# Patient Record
Sex: FEMALE | Race: BLACK OR AFRICAN AMERICAN | Employment: FULL TIME | URBAN - METROPOLITAN AREA
[De-identification: names, ages, dates, MRNs, and addresses within clinical notes are randomized per-mention and may not be internally consistent; named-entity substitution may affect disease eponyms.]

---

## 2017-07-11 ENCOUNTER — OFFICE VISIT (OUTPATIENT)
Dept: OBGYN CLINIC | Age: 27
End: 2017-07-11
Payer: MEDICAID

## 2017-07-11 VITALS
TEMPERATURE: 97.5 F | SYSTOLIC BLOOD PRESSURE: 117 MMHG | WEIGHT: 155.13 LBS | HEIGHT: 65 IN | HEART RATE: 83 BPM | OXYGEN SATURATION: 100 % | DIASTOLIC BLOOD PRESSURE: 65 MMHG | BODY MASS INDEX: 25.85 KG/M2

## 2017-07-11 DIAGNOSIS — O09.891 HISTORY OF PRETERM DELIVERY, CURRENTLY PREGNANT, FIRST TRIMESTER: ICD-10-CM

## 2017-07-11 DIAGNOSIS — O09.291 HX OF PREECLAMPSIA, PRIOR PREGNANCY, CURRENTLY PREGNANT, FIRST TRIMESTER: ICD-10-CM

## 2017-07-11 DIAGNOSIS — N91.2 AMENORRHEA: ICD-10-CM

## 2017-07-11 DIAGNOSIS — Z23 NEED FOR PROPHYLACTIC VACCINATION AGAINST DIPHTHERIA-TETANUS-PERTUSSIS (DTP): Primary | ICD-10-CM

## 2017-07-11 PROBLEM — O09.899 HISTORY OF PRETERM DELIVERY, CURRENTLY PREGNANT: Status: ACTIVE | Noted: 2017-07-11

## 2017-07-11 PROBLEM — O09.299 HX OF PREECLAMPSIA, PRIOR PREGNANCY, CURRENTLY PREGNANT: Status: ACTIVE | Noted: 2017-07-11

## 2017-07-11 PROCEDURE — 99213 OFFICE O/P EST LOW 20 MIN: CPT | Performed by: ADVANCED PRACTICE MIDWIFE

## 2017-07-11 RX ORDER — PNV NO.95/FERROUS FUM/FOLIC AC 28MG-0.8MG
1 TABLET ORAL DAILY
Qty: 30 TABLET | Refills: 12 | Status: SHIPPED | OUTPATIENT
Start: 2017-07-11

## 2017-07-17 ENCOUNTER — ROUTINE PRENATAL (OUTPATIENT)
Dept: PERINATAL CARE | Age: 27
End: 2017-07-17
Payer: MEDICAID

## 2017-07-17 VITALS
RESPIRATION RATE: 20 BRPM | HEART RATE: 100 BPM | SYSTOLIC BLOOD PRESSURE: 107 MMHG | WEIGHT: 159 LBS | BODY MASS INDEX: 26.46 KG/M2 | DIASTOLIC BLOOD PRESSURE: 67 MMHG | TEMPERATURE: 98.5 F

## 2017-07-17 DIAGNOSIS — Z3A.01 6 WEEKS GESTATION OF PREGNANCY: ICD-10-CM

## 2017-07-17 DIAGNOSIS — O36.80X0 EXAMINATION TO DETERMINE FETAL VIABILITY OF PREGNANCY, NOT APPLICABLE OR UNSPECIFIED FETUS: Primary | ICD-10-CM

## 2017-07-17 DIAGNOSIS — O09.291 HX OF PREECLAMPSIA, PRIOR PREGNANCY, CURRENTLY PREGNANT, FIRST TRIMESTER: ICD-10-CM

## 2017-07-17 PROCEDURE — 76817 TRANSVAGINAL US OBSTETRIC: CPT | Performed by: OBSTETRICS & GYNECOLOGY

## 2017-07-26 ENCOUNTER — HOSPITAL ENCOUNTER (OUTPATIENT)
Age: 27
Setting detail: SPECIMEN
Discharge: HOME | End: 2017-07-26
Payer: MEDICAID

## 2017-07-26 ENCOUNTER — ROUTINE PRENATAL (OUTPATIENT)
Dept: OBGYN CLINIC | Age: 27
End: 2017-07-26
Payer: MEDICAID

## 2017-07-26 VITALS
SYSTOLIC BLOOD PRESSURE: 120 MMHG | HEART RATE: 104 BPM | BODY MASS INDEX: 25.96 KG/M2 | DIASTOLIC BLOOD PRESSURE: 72 MMHG | WEIGHT: 156 LBS

## 2017-07-26 DIAGNOSIS — Z34.91 PRENATAL CARE, FIRST TRIMESTER: ICD-10-CM

## 2017-07-26 DIAGNOSIS — Z3A.08 8 WEEKS GESTATION OF PREGNANCY: ICD-10-CM

## 2017-07-26 DIAGNOSIS — Z34.91 PRENATAL CARE, FIRST TRIMESTER: Primary | ICD-10-CM

## 2017-07-26 LAB
AMPHETAMINE SCREEN URINE: NEGATIVE
BARBITURATE SCREEN URINE: NEGATIVE
BENZODIAZEPINE SCREEN, URINE: NEGATIVE
BILIRUBIN URINE: NEGATIVE
BUPRENORPHINE URINE: NORMAL
CANNABINOID SCREEN URINE: NEGATIVE
COCAINE METABOLITE, URINE: NEGATIVE
COLOR: YELLOW
COMMENT UA: NORMAL
GLUCOSE URINE: NEGATIVE
KETONES, URINE: NEGATIVE
LEUKOCYTE ESTERASE, URINE: NEGATIVE
MDMA URINE: NORMAL
METHADONE SCREEN, URINE: NEGATIVE
METHAMPHETAMINE, URINE: NORMAL
NITRITE, URINE: NEGATIVE
OPIATES, URINE: NEGATIVE
OXYCODONE SCREEN URINE: NEGATIVE
PH UA: 6.5 (ref 5–8)
PHENCYCLIDINE, URINE: NEGATIVE
PROPOXYPHENE, URINE: NORMAL
PROTEIN UA: NEGATIVE
SPECIFIC GRAVITY UA: 1.02 (ref 1–1.03)
TEST INFORMATION: NORMAL
TRICYCLIC ANTIDEPRESSANTS, UR: NORMAL
TURBIDITY: CLEAR
URINE HGB: NEGATIVE
UROBILINOGEN, URINE: NORMAL

## 2017-07-26 PROCEDURE — 99214 OFFICE O/P EST MOD 30 MIN: CPT | Performed by: ADVANCED PRACTICE MIDWIFE

## 2017-07-27 LAB
C. TRACHOMATIS DNA ,URINE: NEGATIVE
CULTURE: NORMAL
CULTURE: NORMAL
Lab: NORMAL
N. GONORRHOEAE DNA, URINE: NEGATIVE
SPECIMEN DESCRIPTION: NORMAL
STATUS: NORMAL

## 2017-08-22 ENCOUNTER — ROUTINE PRENATAL (OUTPATIENT)
Dept: OBGYN CLINIC | Age: 27
End: 2017-08-22
Payer: MEDICAID

## 2017-08-22 VITALS
DIASTOLIC BLOOD PRESSURE: 70 MMHG | SYSTOLIC BLOOD PRESSURE: 107 MMHG | WEIGHT: 158 LBS | BODY MASS INDEX: 26.29 KG/M2 | HEART RATE: 120 BPM

## 2017-08-22 DIAGNOSIS — Z3A.12 12 WEEKS GESTATION OF PREGNANCY: ICD-10-CM

## 2017-08-22 DIAGNOSIS — Z34.91 PRENATAL CARE, FIRST TRIMESTER: Primary | ICD-10-CM

## 2017-08-22 PROCEDURE — 99213 OFFICE O/P EST LOW 20 MIN: CPT | Performed by: ADVANCED PRACTICE MIDWIFE

## 2017-08-28 ENCOUNTER — ROUTINE PRENATAL (OUTPATIENT)
Dept: PERINATAL CARE | Age: 27
End: 2017-08-28
Payer: MEDICAID

## 2017-08-28 VITALS
SYSTOLIC BLOOD PRESSURE: 106 MMHG | RESPIRATION RATE: 20 BRPM | WEIGHT: 161 LBS | DIASTOLIC BLOOD PRESSURE: 60 MMHG | BODY MASS INDEX: 26.79 KG/M2 | HEART RATE: 88 BPM | TEMPERATURE: 98.4 F

## 2017-08-28 DIAGNOSIS — Z36.9 FIRST TRIMESTER SCREENING: Primary | ICD-10-CM

## 2017-08-28 DIAGNOSIS — Z3A.12 12 WEEKS GESTATION OF PREGNANCY: ICD-10-CM

## 2017-08-28 DIAGNOSIS — O09.891 SHORT INTERVAL BETWEEN PREGNANCIES COMPLICATING PREGNANCY, ANTEPARTUM, FIRST TRIMESTER: ICD-10-CM

## 2017-08-28 DIAGNOSIS — O09.291 HX OF PREECLAMPSIA, PRIOR PREGNANCY, CURRENTLY PREGNANT, FIRST TRIMESTER: ICD-10-CM

## 2017-08-28 PROBLEM — O09.899 SHORT INTERVAL BETWEEN PREGNANCIES COMPLICATING PREGNANCY, ANTEPARTUM: Status: ACTIVE | Noted: 2017-08-28

## 2017-08-28 PROCEDURE — 76813 OB US NUCHAL MEAS 1 GEST: CPT | Performed by: OBSTETRICS & GYNECOLOGY

## 2017-08-28 PROCEDURE — 76801 OB US < 14 WKS SINGLE FETUS: CPT | Performed by: OBSTETRICS & GYNECOLOGY

## 2017-09-14 ENCOUNTER — ROUTINE PRENATAL (OUTPATIENT)
Dept: OBGYN CLINIC | Age: 27
End: 2017-09-14
Payer: MEDICAID

## 2017-09-14 VITALS
WEIGHT: 162 LBS | HEART RATE: 86 BPM | BODY MASS INDEX: 26.96 KG/M2 | DIASTOLIC BLOOD PRESSURE: 60 MMHG | SYSTOLIC BLOOD PRESSURE: 110 MMHG

## 2017-09-14 DIAGNOSIS — O09.292 HX OF PREECLAMPSIA, PRIOR PREGNANCY, CURRENTLY PREGNANT, SECOND TRIMESTER: Primary | ICD-10-CM

## 2017-09-14 DIAGNOSIS — H53.8 BLURRED VISION, RIGHT EYE: ICD-10-CM

## 2017-09-14 DIAGNOSIS — R51.9 HEADACHE, UNSPECIFIED HEADACHE TYPE: ICD-10-CM

## 2017-09-14 PROCEDURE — 99213 OFFICE O/P EST LOW 20 MIN: CPT | Performed by: NURSE PRACTITIONER

## 2017-09-20 ENCOUNTER — HOSPITAL ENCOUNTER (OUTPATIENT)
Age: 27
Setting detail: SPECIMEN
Discharge: HOME | End: 2017-09-20
Payer: MEDICAID

## 2017-09-20 ENCOUNTER — ROUTINE PRENATAL (OUTPATIENT)
Dept: OBGYN CLINIC | Age: 27
End: 2017-09-20
Payer: MEDICAID

## 2017-09-20 VITALS
SYSTOLIC BLOOD PRESSURE: 111 MMHG | BODY MASS INDEX: 27.47 KG/M2 | HEART RATE: 110 BPM | DIASTOLIC BLOOD PRESSURE: 69 MMHG | WEIGHT: 165.1 LBS

## 2017-09-20 DIAGNOSIS — Z34.92 PRENATAL CARE, SECOND TRIMESTER: ICD-10-CM

## 2017-09-20 DIAGNOSIS — Z3A.16 16 WEEKS GESTATION OF PREGNANCY: Primary | ICD-10-CM

## 2017-09-20 DIAGNOSIS — Z3A.16 16 WEEKS GESTATION OF PREGNANCY: ICD-10-CM

## 2017-09-20 LAB
ABO/RH: NORMAL
ABSOLUTE EOS #: 0.1 K/UL (ref 0–0.4)
ABSOLUTE LYMPH #: 2.2 K/UL (ref 1–4.8)
ABSOLUTE MONO #: 0.4 K/UL (ref 0.1–1.2)
ANTIBODY IDENTIFICATION: NORMAL
ANTIBODY SCREEN: POSITIVE
BASOPHILS # BLD: 1 %
BASOPHILS ABSOLUTE: 0 K/UL (ref 0–0.2)
DIFFERENTIAL TYPE: ABNORMAL
EOSINOPHILS RELATIVE PERCENT: 2 %
HCT VFR BLD CALC: 33.3 % (ref 36–46)
HEMOGLOBIN: 10.9 G/DL (ref 12–16)
HEPATITIS B SURFACE ANTIGEN: NONREACTIVE
HIV AG/AB: NONREACTIVE
LYMPHOCYTES # BLD: 34 %
MCH RBC QN AUTO: 28.6 PG (ref 26–34)
MCHC RBC AUTO-ENTMCNC: 32.5 G/DL (ref 31–37)
MCV RBC AUTO: 87.8 FL (ref 80–100)
MONOCYTES # BLD: 6 %
PDW BLD-RTO: 15 % (ref 12.5–15.4)
PLATELET # BLD: 220 K/UL (ref 140–450)
PLATELET ESTIMATE: ABNORMAL
PMV BLD AUTO: 8.6 FL (ref 6–12)
RBC # BLD: 3.79 M/UL (ref 4–5.2)
RBC # BLD: ABNORMAL 10*6/UL
RUBV IGG SER QL: 472.6 IU/ML
SEG NEUTROPHILS: 57 %
SEGMENTED NEUTROPHILS ABSOLUTE COUNT: 3.8 K/UL (ref 1.8–7.7)
T. PALLIDUM, IGG: NONREACTIVE
WBC # BLD: 6.5 K/UL (ref 3.5–11)
WBC # BLD: ABNORMAL 10*3/UL

## 2017-09-20 PROCEDURE — 99213 OFFICE O/P EST LOW 20 MIN: CPT | Performed by: ADVANCED PRACTICE MIDWIFE

## 2017-10-11 ENCOUNTER — HOSPITAL ENCOUNTER (EMERGENCY)
Age: 27
Discharge: HOME | End: 2017-10-12
Attending: EMERGENCY MEDICINE
Payer: MEDICAID

## 2017-10-11 VITALS
RESPIRATION RATE: 14 BRPM | HEART RATE: 96 BPM | DIASTOLIC BLOOD PRESSURE: 66 MMHG | TEMPERATURE: 97.2 F | SYSTOLIC BLOOD PRESSURE: 115 MMHG | OXYGEN SATURATION: 97 %

## 2017-10-11 DIAGNOSIS — B37.9 YEAST INFECTION: Primary | ICD-10-CM

## 2017-10-11 PROCEDURE — 87491 CHLMYD TRACH DNA AMP PROBE: CPT

## 2017-10-11 PROCEDURE — 87591 N.GONORRHOEAE DNA AMP PROB: CPT

## 2017-10-11 PROCEDURE — 99284 EMERGENCY DEPT VISIT MOD MDM: CPT

## 2017-10-11 PROCEDURE — 87660 TRICHOMONAS VAGIN DIR PROBE: CPT

## 2017-10-11 PROCEDURE — 87510 GARDNER VAG DNA DIR PROBE: CPT

## 2017-10-11 PROCEDURE — 87480 CANDIDA DNA DIR PROBE: CPT

## 2017-10-11 PROCEDURE — 81003 URINALYSIS AUTO W/O SCOPE: CPT

## 2017-10-11 ASSESSMENT — ENCOUNTER SYMPTOMS
EYE PAIN: 0
ABDOMINAL PAIN: 0
VOMITING: 0
COLOR CHANGE: 0
COUGH: 0
NAUSEA: 0

## 2017-10-11 ASSESSMENT — PAIN DESCRIPTION - LOCATION: LOCATION: ABDOMEN

## 2017-10-11 ASSESSMENT — PAIN SCALES - GENERAL: PAINLEVEL_OUTOF10: 3

## 2017-10-11 ASSESSMENT — PAIN DESCRIPTION - DESCRIPTORS: DESCRIPTORS: CRAMPING

## 2017-10-11 ASSESSMENT — PAIN DESCRIPTION - ORIENTATION: ORIENTATION: RIGHT;LOWER

## 2017-10-11 ASSESSMENT — PAIN DESCRIPTION - PAIN TYPE: TYPE: ACUTE PAIN

## 2017-10-12 ENCOUNTER — TELEPHONE (OUTPATIENT)
Dept: OBGYN CLINIC | Age: 27
End: 2017-10-12

## 2017-10-12 LAB
BILIRUBIN URINE: NEGATIVE
C TRACH DNA GENITAL QL NAA+PROBE: NEGATIVE
COLOR: YELLOW
COMMENT UA: NORMAL
DIRECT EXAM: ABNORMAL
GLUCOSE URINE: NEGATIVE
KETONES, URINE: NEGATIVE
LEUKOCYTE ESTERASE, URINE: NEGATIVE
Lab: ABNORMAL
N. GONORRHOEAE DNA: NEGATIVE
NITRITE, URINE: NEGATIVE
PH UA: 6 (ref 5–8)
PROTEIN UA: NEGATIVE
SPECIFIC GRAVITY UA: 1.02 (ref 1–1.03)
SPECIMEN DESCRIPTION: ABNORMAL
STATUS: ABNORMAL
TURBIDITY: CLEAR
URINE HGB: NEGATIVE
UROBILINOGEN, URINE: NORMAL

## 2017-10-12 RX ORDER — CLOTRIMAZOLE 1 %
CREAM WITH APPLICATOR VAGINAL
Qty: 1 TUBE | Refills: 0 | Status: SHIPPED | OUTPATIENT
Start: 2017-10-12 | End: 2017-10-19

## 2017-10-12 NOTE — CONSULTS
chills  HEENT: negative visual disturbances, negative headaches  Respiratory: negative dyspnea, negative cough  Cardiovascular: negative chest pain,  negative palpitations  Gastrointestinal: lower abdominal cramping, negative RUQ pain, negative N/V, negative diarrhea, negative constipation  Genitourinary: negative dysuria, negative vaginal discharge, positive vaginal spotting   Dermatological: negative rash  Hematologic: negative bruising  Immunologic/Lymphatic: negative recent illness, negative recent sick contact  Musculoskeletal: negative back pain, negative myalgias, negative arthralgias  Neurological:  negative dizziness, negative weakness  Behavior/Psych: negative depression, negative anxiety      OBSTETRICAL HISTORY:   Obstetric History       T1      L2     SAB0   TAB0   Ectopic0   Molar0   Multiple0   Live Births0       # Outcome Date GA Lbr Epifanio/2nd Weight Sex Delivery Anes PTL Lv   3 Current            2  10/10/15 29w0d  2 lb 6 oz (1.077 kg) F Vag-Spont      1 Term 09 38w0d  5 lb 1 oz (2.296 kg) F Vag-Spont             PAST MEDICAL HISTORY:   has a past medical history of Depression. PAST SURGICAL HISTORY:   has no past surgical history on file. ALLERGIES:  has No Known Allergies. MEDICATIONS:  Prior to Admission medications    Medication Sig Start Date End Date Taking? Authorizing Provider   aspirin 81 MG tablet Take 81 mg by mouth daily   Yes Historical Provider, MD   Prenatal Vit-Fe Fumarate-FA (PRENATAL VITAMINS) 28-0.8 MG TABS Take 1 tablet by mouth daily Please dispense any generic insurance will cover. 17  Yes Sherry Rojas CNM       FAMILY HISTORY:  family history includes Alcohol Abuse in her paternal uncle;  Allergy (Severe) in her maternal grandmother; Arthritis in her maternal grandmother and paternal grandfather; Asthma in her maternal grandmother; Cancer in her paternal aunt; Depression in her mother; Diabetes in her maternal grandfather, maternal Hematocrit, Platelets: 58.5 / 84.1 / 220  Rubella: immune  T. Pallidum, IgG: non-reactive   Hepatitis B Surface Antigen: non-reactive   HIV: non-reactive   Sickle Cell Screen: not available  Gonorrhea: negative  Chlamydia: negative  Urine culture: negative, date: 2017    1 hour Glucose Tolerance Test: not done  3 hour Glucose Tolerance Test: N/A    Group B Strep: not done  Cystic Fibrosis Screen: not available  First Trimester Screen: low risk, patient declined, not available  MSAFP/Multiple Markers: not available  Non-Invasive Prenatal Testing: not available  Anatomy US: not available    ASSESSMENT & PLAN:  Margarita Conley is a 23 y.o. female  at 19w2d    - GBS not done / Rh positive / R immune   - No indication for GBS prophylaxis    Vaginal spotting   - Patient complaining of minimal vaginal spotting with wiping earlier today, also complaining of vaginal discharge   - Afebrile, VSS   - FHT: 145   - UA negative   - GC/C cultures pending   - Vaginitis probe positive for Candida, advise Fluconazole 150mg PO x1 dose    - Patient reports that symptoms have improved since onset    Patient okay to be discharged to home. Encouraged increased PO hydration. Recommended follow up with [facility] as scheduled.  labor precautions and s/s of preeclampsia reviewed. For all patients over 28 weeks gestation, Kick sheet parameters every 8 hours were reviewed and recommended. Pelvic rest was not recommended for this patient. All questions answered. Patient vocalized understanding.     Patient Active Problem List    Diagnosis Date Noted    Short interval between pregnancies complicating pregnancy, antepartum 2017    Need for prophylactic vaccination against diphtheria-tetanus-pertussis (DTP) 2017    Amenorrhea 2017    Hx of preeclampsia, prior pregnancy, currently pregnant 2017/MFM advised daily ASA, getting thrombophilia labs      History of  delivery, currently pregnant 07/11/2017       Plan discussed with senior resident and Marilyn Curry CNM, who is agreeable. Steroids given this admission: No    Risks, benefits, alternatives and possible complications have been discussed in detail with the patient. Admission, and post admission procedures and expectations were discussed in detail. All questions were answered. Attending's Name: Dr. Fariha Treadwell    Thank you for allowing us to participate in the care of this patient. Please do not hesitate to contact us with any further questions or concerns.      Jacob Galan, DO  Ob/Gyn Resident PGY1  [facility]  10/12/2017 1:51 AM

## 2017-10-12 NOTE — ED PROVIDER NOTES
(PRENATAL VITAMINS) 28-0.8 MG TABS Take 1 tablet by mouth daily Please dispense any generic insurance will cover. , Disp-30 tablet, R-12Normal             ALLERGIES     has No Known Allergies. FAMILY HISTORY     indicated that the status of her mother is unknown. She indicated that the status of her maternal grandmother is unknown. She indicated that the status of her maternal grandfather is unknown. She indicated that the status of her paternal grandmother is unknown. She indicated that the status of her paternal grandfather is unknown. She indicated that the status of her paternal aunt is unknown. She indicated that the status of her paternal uncle is unknown. She indicated that the status of her neg hx is unknown.      family history includes Alcohol Abuse in her paternal uncle; Allergy (Severe) in her maternal grandmother; Arthritis in her maternal grandmother and paternal grandfather; Asthma in her maternal grandmother; Cancer in her paternal aunt; Depression in her mother; Diabetes in her maternal grandfather, maternal grandmother, paternal grandfather, and paternal grandmother; Heart Attack in her paternal grandfather; High Blood Pressure in her maternal grandfather, maternal grandmother, paternal grandfather, and paternal grandmother; Kidney Disease in her maternal grandmother. SOCIAL HISTORY      reports that she has never smoked. She does not have any smokeless tobacco history on file. She reports that she does not drink alcohol or use drugs. PHYSICAL EXAM     INITIAL VITALS:  oral temperature is 97.2 °F (36.2 °C). Her blood pressure is 115/66 and her pulse is 96. Her respiration is 14 and oxygen saturation is 97%. Physical Exam   Constitutional: She is oriented to person, place, and time. She appears well-developed and well-nourished. No distress. HENT:   Head: Normocephalic and atraumatic. Eyes: Conjunctivae and EOM are normal. Pupils are equal, round, and reactive to light.  Right eye exhibits no discharge. Left eye exhibits no discharge. Neck: Normal range of motion. Neck supple. No tracheal deviation present. Cardiovascular: Normal rate, regular rhythm and normal heart sounds. No murmur heard. Pulmonary/Chest: Effort normal and breath sounds normal. No respiratory distress. She has no wheezes. She has no rales. Abdominal: Soft. Bowel sounds are normal. She exhibits no distension. There is no tenderness. Gravid uterus below umbilicus   Musculoskeletal: Normal range of motion. She exhibits no tenderness. Neurological: She is alert and oriented to person, place, and time. No cranial nerve deficit. Skin: Skin is warm and dry. No rash noted. She is not diaphoretic. Psychiatric: She has a normal mood and affect. Nursing note and vitals reviewed. DIFFERENTIAL DIAGNOSIS/MDM:   Abruption, previa, miscarriage, STD, uti    Patient remained hemodynamically stable well-appearing on exam.  She has no abdominal tenderness on initial or repeat examination. Vaginitis probe remarkable for Candida with no trichomonas seen. Patient does not have a UTI. Fetal heart rate is 145 with good fetal motion on bedside ultrasound. I did call the ObGyn residents who evaluated the patient at bedside and do not feel she will require a formal ultrasound. They recommended the patient be treated for her candidal infection. She is given clotrimazole cream for this and will follow up with [facility] as an outpatient. DIAGNOSTIC RESULTS     EKG: All EKG's are interpreted by the Emergency Department Physician who either signs or Co-signs this chart in the absence of a cardiologist.        RADIOLOGY:   I directly visualized the following  images and reviewed the radiologist interpretations:  No orders to display           ED BEDSIDE ULTRASOUND:   FETAL ULTRASOUND: A limited, bedside pelvic ultrasound was performed using a transabdominal probe.  The medical necessity was to evaluate for signs of fetal distress. The structures studied were the uterus and its contents. FINDINGS:  Fetus was visualized  Gross fetal movement was visualized. Fetal heart tones measured at 145 bpm.  The study was technically adequate. LABS:  Labs Reviewed   VAGINITIS DNA PROBE - Abnormal; Notable for the following:        Result Value    Direct Exam POSITIVE for Candida sp. (*)     All other components within normal limits   C.TRACHOMATIS N.GONORRHOEAE DNA   UA W/REFLEX CULTURE             EMERGENCY DEPARTMENT COURSE:   Vitals:    Vitals:    10/11/17 2308   BP: 115/66   Pulse: 96   Resp: 14   Temp: 97.2 °F (36.2 °C)   TempSrc: Oral   SpO2: 97%         CRITICAL CARE:      CONSULTS:  IP CONSULT TO OB GYN      PROCEDURES:  Procedures      FINAL IMPRESSION      1. Yeast infection            DISPOSITION/PLAN   DISPOSITION Decision to Discharge        PATIENT REFERRED TO:  Mae Mclaughlin  [address] [place] [address]  [phone]    Schedule an appointment as soon as possible for a visit in 1 week        DISCHARGE MEDICATIONS:  Discharge Medication List as of 10/12/2017  1:37 AM      START taking these medications    Details   clotrimazole (LOTRIMIN) 1 % vaginal cream Place vaginally 2 times daily.  For 3 days, Disp-1 Tube, R-0Print             (Please note that portions of this note were completed with a voice recognition program.  Efforts were made to edit the dictations but occasionally words are mis-transcribed.)    Ronen De La Fuente  Emergency Medicine Resident              Jean Panchal MD  10/12/17 [id number]

## 2017-10-12 NOTE — TELEPHONE ENCOUNTER
Calling to inform that she is passing mucous-like discharge, she denies cramping or pain. Thinks there may be some blood-tinge to it. Denies recent intercourse, S/S of UTI or vaginitis. Is concerned as it keeps coming out.   Advised to present for evaluation

## 2017-10-12 NOTE — ED PROVIDER NOTES
dates. Bedside ultrasound confirms fetal heart rate and movement which are normal.  Pelvic exam is without bleeding. Plan is consultation with OBJoe Howell.  Gennaro Wick MD, [address]  Attending Emergency  Physician                Korin Leal MD  10/12/17 [id number]

## 2017-10-18 ENCOUNTER — ROUTINE PRENATAL (OUTPATIENT)
Dept: OBGYN CLINIC | Age: 27
End: 2017-10-18
Payer: MEDICAID

## 2017-10-18 VITALS
SYSTOLIC BLOOD PRESSURE: 116 MMHG | BODY MASS INDEX: 28.27 KG/M2 | WEIGHT: 169.9 LBS | DIASTOLIC BLOOD PRESSURE: 63 MMHG | HEART RATE: 99 BPM

## 2017-10-18 DIAGNOSIS — Z34.92 PRENATAL CARE, SECOND TRIMESTER: Primary | ICD-10-CM

## 2017-10-18 DIAGNOSIS — Z3A.20 20 WEEKS GESTATION OF PREGNANCY: ICD-10-CM

## 2017-10-18 PROCEDURE — 99213 OFFICE O/P EST LOW 20 MIN: CPT | Performed by: ADVANCED PRACTICE MIDWIFE

## 2017-10-18 NOTE — PROGRESS NOTES
SUBJECTIVE:  Emerita Rosas is here for her return OB visit. She reports fetal movement. She denies vaginal bleeding. She denies vaginal discharge. She denies leaking of fluid. She denies uterine contraction activity. She denies nausea and/or vomiting. She denies retaining fluid in her extremities. Feeling fine. OBJECTIVE:  Blood pressure 116/63, pulse 99, weight 169 lb 14.4 oz (77.1 kg), last menstrual period 05/21/2017, not currently breastfeeding. ASSESSMENT:  IUP @ 20 weeks  S = D      PLAN:  Emerita Rosas will monitor fetal movement daily. The problem list was reviewed and updated as needed. Quad screen and or single marker AFP results were not discussed. Labs were not ordered. BMI and appropriate weight gain recommendations were discussed. Normal: BMI < 25: Gain 25-35 lb  Overweight: BMI 25-30: Gain 15-25 lb  Obese: BMI > 30: Gain 11-20 lb  Emerita Rosas was counseled regarding ligament stretching. More than 50% of this 20 min visit was for counseling and education.

## 2017-10-23 ENCOUNTER — ROUTINE PRENATAL (OUTPATIENT)
Dept: PERINATAL CARE | Age: 27
End: 2017-10-23
Payer: MEDICAID

## 2017-10-23 ENCOUNTER — HOSPITAL ENCOUNTER (OUTPATIENT)
Age: 27
Discharge: HOME | End: 2017-10-23
Payer: MEDICAID

## 2017-10-23 VITALS
BODY MASS INDEX: 28.32 KG/M2 | TEMPERATURE: 98.5 F | SYSTOLIC BLOOD PRESSURE: 102 MMHG | WEIGHT: 170 LBS | HEART RATE: 92 BPM | RESPIRATION RATE: 20 BRPM | HEIGHT: 65 IN | DIASTOLIC BLOOD PRESSURE: 60 MMHG

## 2017-10-23 DIAGNOSIS — Z3A.20 20 WEEKS GESTATION OF PREGNANCY: ICD-10-CM

## 2017-10-23 DIAGNOSIS — O09.299 HX OF PREECLAMPSIA, PRIOR PREGNANCY, CURRENTLY PREGNANT: Primary | ICD-10-CM

## 2017-10-23 DIAGNOSIS — Z36.86 ENCOUNTER FOR SCREENING FOR RISK OF PRE-TERM LABOR: ICD-10-CM

## 2017-10-23 DIAGNOSIS — O09.899 SHORT INTERVAL BETWEEN PREGNANCIES COMPLICATING PREGNANCY, ANTEPARTUM: ICD-10-CM

## 2017-10-23 LAB — HOMOCYSTEINE: 6 UMOL/L

## 2017-10-23 PROCEDURE — G8484 FLU IMMUNIZE NO ADMIN: HCPCS | Performed by: OBSTETRICS & GYNECOLOGY

## 2017-10-23 PROCEDURE — 82105 ALPHA-FETOPROTEIN SERUM: CPT

## 2017-10-23 PROCEDURE — G8427 DOCREV CUR MEDS BY ELIG CLIN: HCPCS | Performed by: OBSTETRICS & GYNECOLOGY

## 2017-10-23 PROCEDURE — 86147 CARDIOLIPIN ANTIBODY EA IG: CPT

## 2017-10-23 PROCEDURE — 99242 OFF/OP CONSLTJ NEW/EST SF 20: CPT | Performed by: OBSTETRICS & GYNECOLOGY

## 2017-10-23 PROCEDURE — 81240 F2 GENE: CPT

## 2017-10-23 PROCEDURE — G8417 CALC BMI ABV UP PARAM F/U: HCPCS | Performed by: OBSTETRICS & GYNECOLOGY

## 2017-10-23 PROCEDURE — 81241 F5 GENE: CPT

## 2017-10-23 PROCEDURE — 85303 CLOT INHIBIT PROT C ACTIVITY: CPT

## 2017-10-23 PROCEDURE — 85730 THROMBOPLASTIN TIME PARTIAL: CPT

## 2017-10-23 PROCEDURE — 83090 ASSAY OF HOMOCYSTEINE: CPT

## 2017-10-23 PROCEDURE — 81291 MTHFR GENE: CPT

## 2017-10-23 PROCEDURE — 36415 COLL VENOUS BLD VENIPUNCTURE: CPT

## 2017-10-23 PROCEDURE — 76817 TRANSVAGINAL US OBSTETRIC: CPT | Performed by: OBSTETRICS & GYNECOLOGY

## 2017-10-23 PROCEDURE — 85306 CLOT INHIBIT PROT S FREE: CPT

## 2017-10-23 PROCEDURE — 76811 OB US DETAILED SNGL FETUS: CPT | Performed by: OBSTETRICS & GYNECOLOGY

## 2017-10-23 PROCEDURE — 85613 RUSSELL VIPER VENOM DILUTED: CPT

## 2017-10-23 PROCEDURE — 85610 PROTHROMBIN TIME: CPT

## 2017-10-23 PROCEDURE — 85300 ANTITHROMBIN III ACTIVITY: CPT

## 2017-10-24 LAB
AFP INTERPRETATION: NORMAL
AFP MOM: 1.94
AFP SPECIMEN: NORMAL
AFP: 126 NG/ML
AT-III ACTIVITY: 98 % (ref 83–122)
DATE OF BIRTH: NORMAL
DATING METHOD: NORMAL
DETERMINED BY: NORMAL
DIABETIC: NORMAL
DUE DATE: NORMAL
ESTIMATED DUE DATE: NORMAL
FAMILY HISTORY NTD: NORMAL
GESTATIONAL AGE: 20.86 WEEKS
HISTORY OF ANEUPLOIDY?: NORMAL
INSULIN REQ DIABETES: NO
LAST MENSTRUAL PERIOD: NORMAL
MATERNAL AGE AT EDD: 26.3 YR
MATERNAL WEIGHT: 170
NUMBER OF FETUSES: NORMAL
PATIENT WEIGHT: 170 LBS
PHYSICIAN: NORMAL
PROTEIN C ACTIVITY: 104 %
PROTEIN S ACTIVITY: 70 % (ref 59–130)
RACE (MATERNAL): NORMAL
RACE: NORMAL
ZZ NTE CLEAN UP: HISTORY: NO

## 2017-10-26 LAB
ANTICARDIOLIPIN IGA ANTIBODY: 0.6 APU
ANTICARDIOLIPIN IGG ANTIBODY: 3.9 GPU
CARDIOLIPIN AB IGM: 11.8 MPU
DILUTE RUSSELL VIPER VENOM TIME: NORMAL
FACTOR V LEIDEN MUTATION: NORMAL
INR BLD: 0.9
LUPUS ANTICOAG: NORMAL
MTHFR MUTATION 677T/A1298C: NORMAL
PARTIAL THROMBOPLASTIN TIME: 22.5 SEC (ref 21.3–31.3)
PROTHROMBIN G20210A MUTATION: NORMAL
PROTHROMBIN TIME: 9.7 SEC (ref 9.4–12.6)

## 2017-11-15 ENCOUNTER — ROUTINE PRENATAL (OUTPATIENT)
Dept: OBGYN CLINIC | Age: 27
End: 2017-11-15
Payer: MEDICAID

## 2017-11-15 VITALS
WEIGHT: 179 LBS | DIASTOLIC BLOOD PRESSURE: 60 MMHG | HEART RATE: 90 BPM | BODY MASS INDEX: 29.79 KG/M2 | SYSTOLIC BLOOD PRESSURE: 110 MMHG

## 2017-11-15 DIAGNOSIS — Z34.92 PRENATAL CARE IN SECOND TRIMESTER: Primary | ICD-10-CM

## 2017-11-15 DIAGNOSIS — Z3A.24 24 WEEKS GESTATION OF PREGNANCY: ICD-10-CM

## 2017-11-15 PROCEDURE — G8484 FLU IMMUNIZE NO ADMIN: HCPCS | Performed by: ADVANCED PRACTICE MIDWIFE

## 2017-11-15 PROCEDURE — G8417 CALC BMI ABV UP PARAM F/U: HCPCS | Performed by: ADVANCED PRACTICE MIDWIFE

## 2017-11-15 PROCEDURE — G8427 DOCREV CUR MEDS BY ELIG CLIN: HCPCS | Performed by: ADVANCED PRACTICE MIDWIFE

## 2017-11-15 PROCEDURE — 1036F TOBACCO NON-USER: CPT | Performed by: ADVANCED PRACTICE MIDWIFE

## 2017-11-15 PROCEDURE — 99213 OFFICE O/P EST LOW 20 MIN: CPT | Performed by: ADVANCED PRACTICE MIDWIFE

## 2017-11-15 NOTE — PROGRESS NOTES
SUBJECTIVE:  Dorian Davies is here for her return OB visit. She reports fetal movement. She denies vaginal bleeding. She denies vaginal discharge. She denies leaking of fluid. She denies uterine contraction activity. She denies nausea and/or vomiting. She denies retaining fluid in her extremities. Feeling good. OBJECTIVE:  Blood pressure 110/60, pulse 90, weight 179 lb (81.2 kg), last menstrual period 05/21/2017, not currently breastfeeding. ASSESSMENT:  IUP @ 24 weeks  S = D      PLAN:  Dorian Davies will monitor fetal movement daily. The problem list was reviewed and updated as needed. 28 week lab panel was discussed and was ordered. RhoGam was discussed and was not ordered. Dorian Davies was counseled regarding [facility] at [facility]. More than 50% of this 20 min visit was for education and counseling.

## 2017-11-24 ENCOUNTER — TELEPHONE (OUTPATIENT)
Dept: OBGYN CLINIC | Age: 27
End: 2017-11-24

## 2017-11-24 ENCOUNTER — HOSPITAL ENCOUNTER (OUTPATIENT)
Age: 27
Discharge: HOME | End: 2017-11-24
Attending: OBSTETRICS & GYNECOLOGY | Admitting: OBSTETRICS & GYNECOLOGY
Payer: MEDICAID

## 2017-11-24 VITALS
DIASTOLIC BLOOD PRESSURE: 61 MMHG | HEART RATE: 87 BPM | RESPIRATION RATE: 16 BRPM | TEMPERATURE: 98.4 F | SYSTOLIC BLOOD PRESSURE: 115 MMHG

## 2017-11-24 PROBLEM — O09.90 HRP (HIGH RISK PREGNANCY), UNSPECIFIED TRIMESTER: Status: ACTIVE | Noted: 2017-11-24

## 2017-11-24 LAB
ABSOLUTE EOS #: 0.08 K/UL (ref 0–0.44)
ABSOLUTE IMMATURE GRANULOCYTE: <0.03 K/UL (ref 0–0.3)
ABSOLUTE LYMPH #: 1.83 K/UL (ref 1.1–3.7)
ABSOLUTE MONO #: 0.51 K/UL (ref 0.1–1.2)
ALBUMIN SERPL-MCNC: 3.3 G/DL (ref 3.5–5.2)
ALBUMIN/GLOBULIN RATIO: 1.1 (ref 1–2.5)
ALP BLD-CCNC: 69 U/L (ref 35–104)
ALT SERPL-CCNC: 8 U/L (ref 5–33)
ANION GAP SERPL CALCULATED.3IONS-SCNC: 11 MMOL/L (ref 9–17)
AST SERPL-CCNC: 13 U/L
BASOPHILS # BLD: 0 % (ref 0–2)
BASOPHILS ABSOLUTE: <0.03 K/UL (ref 0–0.2)
BILIRUB SERPL-MCNC: <0.1 MG/DL (ref 0.3–1.2)
BUN BLDV-MCNC: 6 MG/DL (ref 6–20)
BUN/CREAT BLD: ABNORMAL (ref 9–20)
CALCIUM SERPL-MCNC: 8.4 MG/DL (ref 8.6–10.4)
CHLORIDE BLD-SCNC: 103 MMOL/L (ref 98–107)
CO2: 21 MMOL/L (ref 20–31)
CREAT SERPL-MCNC: 0.37 MG/DL (ref 0.5–0.9)
CREATININE URINE: 138.4 MG/DL (ref 28–217)
DIFFERENTIAL TYPE: ABNORMAL
EOSINOPHILS RELATIVE PERCENT: 1 % (ref 1–4)
GFR AFRICAN AMERICAN: >60 ML/MIN
GFR NON-AFRICAN AMERICAN: >60 ML/MIN
GFR SERPL CREATININE-BSD FRML MDRD: ABNORMAL ML/MIN/{1.73_M2}
GFR SERPL CREATININE-BSD FRML MDRD: ABNORMAL ML/MIN/{1.73_M2}
GLUCOSE BLD-MCNC: 79 MG/DL (ref 70–99)
HCT VFR BLD CALC: 34.8 % (ref 36.3–47.1)
HEMOGLOBIN: 10.5 G/DL (ref 11.9–15.1)
IMMATURE GRANULOCYTES: 0 %
LACTATE DEHYDROGENASE: 164 U/L (ref 135–214)
LYMPHOCYTES # BLD: 30 % (ref 24–43)
MCH RBC QN AUTO: 28.8 PG (ref 25.2–33.5)
MCHC RBC AUTO-ENTMCNC: 30.2 G/DL (ref 28.4–34.8)
MCV RBC AUTO: 95.3 FL (ref 82.6–102.9)
MONOCYTES # BLD: 8 % (ref 3–12)
PDW BLD-RTO: 13.4 % (ref 11.8–14.4)
PLATELET # BLD: 220 K/UL (ref 138–453)
PLATELET ESTIMATE: ABNORMAL
PMV BLD AUTO: 10.2 FL (ref 8.1–13.5)
POTASSIUM SERPL-SCNC: 3.8 MMOL/L (ref 3.7–5.3)
RBC # BLD: 3.65 M/UL (ref 3.95–5.11)
RBC # BLD: ABNORMAL 10*6/UL
SEG NEUTROPHILS: 60 % (ref 36–65)
SEGMENTED NEUTROPHILS ABSOLUTE COUNT: 3.68 K/UL (ref 1.5–8.1)
SODIUM BLD-SCNC: 135 MMOL/L (ref 135–144)
TOTAL PROTEIN, URINE: 16 MG/DL
TOTAL PROTEIN: 6.2 G/DL (ref 6.4–8.3)
URIC ACID: 3 MG/DL (ref 2.4–5.7)
URINE TOTAL PROTEIN CREATININE RATIO: 0.12 (ref 0–0.2)
WBC # BLD: 6.1 K/UL (ref 3.5–11.3)
WBC # BLD: ABNORMAL 10*3/UL

## 2017-11-24 PROCEDURE — 83615 LACTATE (LD) (LDH) ENZYME: CPT

## 2017-11-24 PROCEDURE — 84156 ASSAY OF PROTEIN URINE: CPT

## 2017-11-24 PROCEDURE — 85025 COMPLETE CBC W/AUTO DIFF WBC: CPT

## 2017-11-24 PROCEDURE — 99213 OFFICE O/P EST LOW 20 MIN: CPT

## 2017-11-24 PROCEDURE — 84550 ASSAY OF BLOOD/URIC ACID: CPT

## 2017-11-24 PROCEDURE — 82570 ASSAY OF URINE CREATININE: CPT

## 2017-11-24 PROCEDURE — 80053 COMPREHEN METABOLIC PANEL: CPT

## 2017-11-24 PROCEDURE — 6370000000 HC RX 637 (ALT 250 FOR IP): Performed by: STUDENT IN AN ORGANIZED HEALTH CARE EDUCATION/TRAINING PROGRAM

## 2017-11-24 RX ORDER — ACETAMINOPHEN 500 MG
1000 TABLET ORAL EVERY 6 HOURS PRN
Status: DISCONTINUED | OUTPATIENT
Start: 2017-11-24 | End: 2017-11-24 | Stop reason: HOSPADM

## 2017-11-24 RX ADMIN — ACETAMINOPHEN 1000 MG: 500 TABLET ORAL at 09:57

## 2017-11-24 ASSESSMENT — PAIN SCALES - GENERAL: PAINLEVEL_OUTOF10: 8

## 2017-11-24 NOTE — PROGRESS NOTES
OB/GYN Resident Interval Note    Patient reports her headache is slightly improved with Tylenol, still complaining of occasional white spots in her vision. Denies chest pain, SOB, RUQ pain, N/V, or increased peripheral edema. Positive fetal movements, denies contractions, vaginal bleeding or leakage of fluid.      Labs:   Recent Results (from the past 24 hour(s))   CREATININE, RANDOM URINE    Collection Time: 11/24/17 10:25 AM   Result Value Ref Range    Creatinine, Ur 138.4 28.0 - 217.0 mg/dL   Protein / Creatinine Ratio, Urine    Collection Time: 11/24/17 10:25 AM   Result Value Ref Range    Total Protein, Urine 16 mg/dL    Urine Total Protein Creatinine Ratio 0.12 0.00 - 0.20   CBC WITH AUTO DIFFERENTIAL    Collection Time: 11/24/17 10:25 AM   Result Value Ref Range    WBC 6.1 3.5 - 11.3 k/uL    RBC 3.65 (L) 3.95 - 5.11 m/uL    Hemoglobin 10.5 (L) 11.9 - 15.1 g/dL    Hematocrit 34.8 (L) 36.3 - 47.1 %    MCV 95.3 82.6 - 102.9 fL    MCH 28.8 25.2 - 33.5 pg    MCHC 30.2 28.4 - 34.8 g/dL    RDW 13.4 11.8 - 14.4 %    Platelets 777 594 - 773 k/uL    MPV 10.2 8.1 - 13.5 fL    Differential Type NOT REPORTED     WBC Morphology NOT REPORTED     RBC Morphology NOT REPORTED     Platelet Estimate NOT REPORTED     Seg Neutrophils 60 36 - 65 %    Lymphocytes 30 24 - 43 %    Monocytes 8 3 - 12 %    Eosinophils % 1 1 - 4 %    Basophils 0 0 - 2 %    Immature Granulocytes 0 0 %    Segs Absolute 3.68 1.50 - 8.10 k/uL    Absolute Lymph # 1.83 1.10 - 3.70 k/uL    Absolute Mono # 0.51 0.10 - 1.20 k/uL    Absolute Eos # 0.08 0.00 - 0.44 k/uL    Basophils # <0.03 0.00 - 0.20 k/uL    Absolute Immature Granulocyte <0.03 0.00 - 0.30 k/uL   COMPREHENSIVE METABOLIC PANEL    Collection Time: 11/24/17 10:25 AM   Result Value Ref Range    Glucose 79 70 - 99 mg/dL    BUN 6 6 - 20 mg/dL    CREATININE 0.37 (L) 0.50 - 0.90 mg/dL    Bun/Cre Ratio NOT REPORTED 9 - 20    Calcium 8.4 (L) 8.6 - 10.4 mg/dL    Sodium 135 135 - 144 mmol/L    Potassium 3.8 3.7 - 5.3 mmol/L    Chloride 103 98 - 107 mmol/L    CO2 21 20 - 31 mmol/L    Anion Gap 11 9 - 17 mmol/L    Alkaline Phosphatase 69 35 - 104 U/L    ALT 8 5 - 33 U/L    AST 13 <32 U/L    Total Bilirubin <0.10 (L) 0.3 - 1.2 mg/dL    Total Protein 6.2 (L) 6.4 - 8.3 g/dL    Alb 3.3 (L) 3.5 - 5.2 g/dL    Albumin/Globulin Ratio 1.1 1.0 - 2.5    GFR Non-African American >60 >60 mL/min    GFR African American >60 >60 mL/min    GFR Comment          GFR Staging NOT REPORTED    LACTATE DEHYDROGENASE    Collection Time: 17 10:25 AM   Result Value Ref Range     135 - 214 U/L   URIC ACID    Collection Time: 17 10:25 AM   Result Value Ref Range    Uric Acid 3.0 2.4 - 5.7 mg/dL       Assessment/Plan:     Patient okay to be discharged to home. Encouraged increased PO hydration. Recommended follow up with OB provider as scheduled.  labor precautions and s/s of preeclampsia reviewed. For all patients over 28 weeks gestation, Kick sheet parameters every 8 hours were reviewed and recommended. Pelvic rest was not recommended for this patient. All questions answered. Patient vocalized understanding. Plan discussed with senior resident and Paolo Manley CNM, who are agreeable with the plan.      Mariel Calvert DO  Ob/Gyn Resident PGY1  [facility]  2017 11:42 AM

## 2017-11-24 NOTE — FLOWSHEET NOTE
Pt received to L&D per w/c from ER with c/o dizziness et headache for 3 days. Placed in recovery 3. Up to BR et gowned; voided.

## 2017-11-24 NOTE — TELEPHONE ENCOUNTER
Patient states she has been experiencing dizziness and blurred vision since yesterday. Instructed patient to have someone take her to [facility] ED this morning. Patient agreed to plan.

## 2017-11-24 NOTE — H&P
OBSTETRICAL HISTORY [facility]    Date: 2017       Time: 9:31 AM   Patient Name: Maritza Combs     Patient : 1990  Room/Bed: [facility]3-01    Admission Date/Time: 2017  9:17 AM      CC: Headache     HPI: Maritza Combs is a 33 y.o.  at 25w3d who presents with headache beginning three days ago. Patient states headache has been constant and has not been relieved with Tylenol or baby aspirin. Also complains of two episodes of blurred vision, lightheadedness and nausea associated with headache. First episode occurred yesterday and her vision went completely black, but returned to baseline spontaneously. She had another episode of blurred vision again earlier this morning. She called her OB provider yesterday and was told to try Tylenol and continue to watch her symptoms, but instructed to come in for evaluation after her second episode of vision changes. Patient denies chest pain, SOB, RUQ pain, vomiting, or increased peripheral edema. Denies urinary symptoms or vaginal discharge. The patient reports fetal movement is present, denies contractions, denies loss of fluid, denies vaginal bleeding. Pregnancy is complicated by H/O pre-eclampsia (G2), H/O indicated PTD (G2 @ 29w0d secondary to pre-eclampsia), and short interval between pregnancies. DATING:  LMP: Patient's last menstrual period was 2017 (approximate).   Estimated Date of Delivery: 3/6/18   Based on: early ultrasound, at 6 6/7 weeks GA    PREGNANCY RISK FACTORS:  Patient Active Problem List   Diagnosis    Need for prophylactic vaccination against diphtheria-tetanus-pertussis (DTP)    Amenorrhea    Hx of preeclampsia, prior pregnancy, currently pregnant    History of  delivery, currently pregnant    Short interval between pregnancies complicating pregnancy, antepartum    HRP (high risk pregnancy), unspecified trimester        Steroids Given In This Pregnancy:  no     REVIEW OF SYSTEMS:   Constitutional: negative fever, negative chills  HEENT: positive visual disturbances, positive headaches  Respiratory: negative dyspnea, negative cough  Cardiovascular: negative chest pain,  negative palpitations  Gastrointestinal: negative abdominal pain, negative RUQ pain, negative N/V, negative diarrhea, negative constipation  Genitourinary: negative dysuria, negative vaginal discharge  Dermatological: negative rash  Hematologic: negative bruising  Immunologic/Lymphatic: negative recent illness, negative recent sick contact  Musculoskeletal: negative back pain, negative myalgias, negative arthralgias  Neurological:  negative dizziness, negative weakness, positive lightheadedness  Behavior/Psych: negative depression, negative anxiety      OBSTETRICAL HISTORY:   Obstetric History       T1      L2     SAB0   TAB0   Ectopic0   Molar0   Multiple0   Live Births0       # Outcome Date GA Lbr Epifanio/2nd Weight Sex Delivery Anes PTL Lv   3 Current            2  10/10/15 29w0d  2 lb 6 oz (1.077 kg) F Vag-Spont      1 Term 09 38w0d  5 lb 1 oz (2.296 kg) F Vag-Spont             PAST MEDICAL HISTORY:   has a past medical history of Depression. PAST SURGICAL HISTORY:   has no past surgical history on file. ALLERGIES:  has No Known Allergies. MEDICATIONS:  Prior to Admission medications    Medication Sig Start Date End Date Taking? Authorizing Provider   aspirin 81 MG tablet Take 81 mg by mouth daily    Historical Provider, MD   Prenatal Vit-Fe Fumarate-FA (PRENATAL VITAMINS) 28-0.8 MG TABS Take 1 tablet by mouth daily Please dispense any generic insurance will cover. 17   Rod Bundy CNM       FAMILY HISTORY:  family history includes Alcohol Abuse in her paternal uncle;  Allergy (Severe) in her maternal grandmother; Arthritis in her maternal grandmother and paternal grandfather; Asthma in her maternal grandmother; Cancer in her paternal aunt; delivery, currently pregnant 07/11/2017       Plan discussed with senior resident and Sera Smith CNM, who is agreeable. Steroids given this admission: No    Risks, benefits, alternatives and possible complications have been discussed in detail with the patient. Admission, and post admission procedures and expectations were discussed in detail. All questions were answered.     Attending's Name: Dr. Margaretmary Boast, DO  Ob/Gyn Resident  11/24/2017, 9:31 AM

## 2017-12-13 ENCOUNTER — HOSPITAL ENCOUNTER (OUTPATIENT)
Age: 27
Setting detail: SPECIMEN
Discharge: HOME | End: 2017-12-13
Payer: MEDICAID

## 2017-12-13 ENCOUNTER — ROUTINE PRENATAL (OUTPATIENT)
Dept: OBGYN CLINIC | Age: 27
End: 2017-12-13
Payer: MEDICAID

## 2017-12-13 VITALS
HEART RATE: 123 BPM | DIASTOLIC BLOOD PRESSURE: 67 MMHG | WEIGHT: 180.3 LBS | SYSTOLIC BLOOD PRESSURE: 125 MMHG | BODY MASS INDEX: 30 KG/M2

## 2017-12-13 DIAGNOSIS — O09.90 HRP (HIGH RISK PREGNANCY), UNSPECIFIED TRIMESTER: Primary | ICD-10-CM

## 2017-12-13 DIAGNOSIS — O09.899 HISTORY OF PRETERM DELIVERY, CURRENTLY PREGNANT: ICD-10-CM

## 2017-12-13 DIAGNOSIS — O09.90 HRP (HIGH RISK PREGNANCY), UNSPECIFIED TRIMESTER: ICD-10-CM

## 2017-12-13 DIAGNOSIS — Z3A.28 28 WEEKS GESTATION OF PREGNANCY: ICD-10-CM

## 2017-12-13 PROBLEM — N91.2 AMENORRHEA: Status: RESOLVED | Noted: 2017-07-11 | Resolved: 2017-12-13

## 2017-12-13 LAB
ABSOLUTE EOS #: 0.11 K/UL (ref 0–0.44)
ABSOLUTE IMMATURE GRANULOCYTE: 0.04 K/UL (ref 0–0.3)
ABSOLUTE LYMPH #: 1.93 K/UL (ref 1.1–3.7)
ABSOLUTE MONO #: 0.44 K/UL (ref 0.1–1.2)
BASOPHILS # BLD: 0 % (ref 0–2)
BASOPHILS ABSOLUTE: <0.03 K/UL (ref 0–0.2)
DIFFERENTIAL TYPE: ABNORMAL
EOSINOPHILS RELATIVE PERCENT: 2 % (ref 1–4)
GLUCOSE ADMINISTRATION: NORMAL
GLUCOSE TOLERANCE SCREEN 50G: 116 MG/DL (ref 70–135)
HCT VFR BLD CALC: 33.9 % (ref 36.3–47.1)
HEMOGLOBIN: 10.6 G/DL (ref 11.9–15.1)
IMMATURE GRANULOCYTES: 1 %
LYMPHOCYTES # BLD: 29 % (ref 24–43)
MCH RBC QN AUTO: 28.4 PG (ref 25.2–33.5)
MCHC RBC AUTO-ENTMCNC: 31.3 G/DL (ref 28.4–34.8)
MCV RBC AUTO: 90.9 FL (ref 82.6–102.9)
MONOCYTES # BLD: 7 % (ref 3–12)
PDW BLD-RTO: 13.5 % (ref 11.8–14.4)
PLATELET # BLD: 238 K/UL (ref 138–453)
PLATELET ESTIMATE: ABNORMAL
PMV BLD AUTO: 11.1 FL (ref 8.1–13.5)
RBC # BLD: 3.73 M/UL (ref 3.95–5.11)
RBC # BLD: ABNORMAL 10*6/UL
SEG NEUTROPHILS: 61 % (ref 36–65)
SEGMENTED NEUTROPHILS ABSOLUTE COUNT: 4.07 K/UL (ref 1.5–8.1)
SICKLE CELL SCREEN: NEGATIVE
WBC # BLD: 6.6 K/UL (ref 3.5–11.3)
WBC # BLD: ABNORMAL 10*3/UL

## 2017-12-13 PROCEDURE — 1036F TOBACCO NON-USER: CPT | Performed by: ADVANCED PRACTICE MIDWIFE

## 2017-12-13 PROCEDURE — G8417 CALC BMI ABV UP PARAM F/U: HCPCS | Performed by: ADVANCED PRACTICE MIDWIFE

## 2017-12-13 PROCEDURE — G8427 DOCREV CUR MEDS BY ELIG CLIN: HCPCS | Performed by: ADVANCED PRACTICE MIDWIFE

## 2017-12-13 PROCEDURE — G8484 FLU IMMUNIZE NO ADMIN: HCPCS | Performed by: ADVANCED PRACTICE MIDWIFE

## 2017-12-13 PROCEDURE — 99213 OFFICE O/P EST LOW 20 MIN: CPT | Performed by: ADVANCED PRACTICE MIDWIFE

## 2017-12-13 NOTE — PROGRESS NOTES
SUBJECTIVE:  Renetta Webb is here for her return OB visit. Doing well, offers no complaints today. Reports has had flu vaccine. Aware of large weight gain, had with prior pregnancies  She reports fetal movement. She denies vaginal bleeding. She denies vaginal discharge. She denies leaking of fluid. She denies uterine contraction activity. She denies nausea and/or vomiting. She denies retaining fluid in her extremities. OBJECTIVE:  Last menstrual period 05/21/2017, not currently breastfeeding. 25 lb TWG to date    Renetta Webb has the flu vaccine as appropriate        ASSESSMENT:  IUP @ 28.1 weeks  S = D  Excess weight gain        PLAN:  The problem list was reviewed and updated with any new issues from today's visit    Renetta Webb will monitor fetal movement daily. 28 week lab panel was discussed and was being done today  Reviewed ASA and is taking. Reinforced S/S PIH and is aware  Discussed weight gain, is exercising but high CHO intake (potatoes!). Discussed lower intake, weight and PIH, verbalizes understanding and will modify diet  Reviewed no further US pending any issues      Renetta Webb was counseled regarding all of the above    Patient was seen with total face to face time of 15 minutes. More than 50% of this visit was for education and counseling.

## 2017-12-26 ENCOUNTER — ROUTINE PRENATAL (OUTPATIENT)
Dept: OBGYN CLINIC | Age: 27
End: 2017-12-26
Payer: MEDICAID

## 2017-12-26 VITALS
HEART RATE: 103 BPM | DIASTOLIC BLOOD PRESSURE: 68 MMHG | SYSTOLIC BLOOD PRESSURE: 107 MMHG | WEIGHT: 181.6 LBS | BODY MASS INDEX: 30.22 KG/M2

## 2017-12-26 DIAGNOSIS — Z3A.30 30 WEEKS GESTATION OF PREGNANCY: Primary | ICD-10-CM

## 2017-12-26 PROCEDURE — G8427 DOCREV CUR MEDS BY ELIG CLIN: HCPCS | Performed by: ADVANCED PRACTICE MIDWIFE

## 2017-12-26 PROCEDURE — G8417 CALC BMI ABV UP PARAM F/U: HCPCS | Performed by: ADVANCED PRACTICE MIDWIFE

## 2017-12-26 PROCEDURE — 99213 OFFICE O/P EST LOW 20 MIN: CPT | Performed by: ADVANCED PRACTICE MIDWIFE

## 2017-12-26 PROCEDURE — 1036F TOBACCO NON-USER: CPT | Performed by: ADVANCED PRACTICE MIDWIFE

## 2017-12-26 PROCEDURE — G8484 FLU IMMUNIZE NO ADMIN: HCPCS | Performed by: ADVANCED PRACTICE MIDWIFE

## 2017-12-26 NOTE — PROGRESS NOTES
SUBJECTIVE:  Dorian Davies is here for her return OB visit. She reports fetal movement. She denies  vaginal bleeding. She denies  vaginal discharge. She denies leaking of fluid. She denies uterine contraction activity. She denies nausea and/or vomiting. She denies retaining fluid in her extremities. Feeling fine and no issues, Baby very active. Reviewed labs and results. No concerns, aware to call if any issues. OBJECTIVE:  Blood pressure 107/68, pulse 103, weight 181 lb 9.6 oz (82.4 kg), last menstrual period 2017, not currently breastfeeding. ASSESSMENT:  IUP @ 30 weeks  S = D    PLAN:  Dorian Davies will monitor fetal movement daily. Fetal Kick Count was discussed and explained  The problem list was reviewed and updated as needed. 28 week lab results were reviewed. She did not receive RhoGam as needed. [place] contractions vs  labor contractions were reviewed. Dorian Davies was counseled regarding symptoms to call about. More than 50% of this 20 min visit was for education and counseling.

## 2018-01-10 ENCOUNTER — ROUTINE PRENATAL (OUTPATIENT)
Dept: OBGYN CLINIC | Age: 28
End: 2018-01-10
Payer: MEDICAID

## 2018-01-10 VITALS
BODY MASS INDEX: 30.75 KG/M2 | WEIGHT: 184.8 LBS | DIASTOLIC BLOOD PRESSURE: 66 MMHG | SYSTOLIC BLOOD PRESSURE: 116 MMHG | HEART RATE: 95 BPM

## 2018-01-10 DIAGNOSIS — O09.90 HRP (HIGH RISK PREGNANCY), UNSPECIFIED TRIMESTER: Primary | Chronic | ICD-10-CM

## 2018-01-10 DIAGNOSIS — Z3A.32 32 WEEKS GESTATION OF PREGNANCY: ICD-10-CM

## 2018-01-10 PROCEDURE — 99213 OFFICE O/P EST LOW 20 MIN: CPT | Performed by: ADVANCED PRACTICE MIDWIFE

## 2018-01-25 ENCOUNTER — ROUTINE PRENATAL (OUTPATIENT)
Dept: OBGYN CLINIC | Age: 28
End: 2018-01-25
Payer: MEDICAID

## 2018-01-25 VITALS
HEIGHT: 65 IN | WEIGHT: 189.3 LBS | HEART RATE: 117 BPM | TEMPERATURE: 97.8 F | DIASTOLIC BLOOD PRESSURE: 71 MMHG | SYSTOLIC BLOOD PRESSURE: 107 MMHG | BODY MASS INDEX: 31.54 KG/M2

## 2018-01-25 DIAGNOSIS — Z23 NEED FOR TDAP VACCINATION: Primary | ICD-10-CM

## 2018-01-25 DIAGNOSIS — Z3A.34 34 WEEKS GESTATION OF PREGNANCY: ICD-10-CM

## 2018-01-25 DIAGNOSIS — Z23 NEED FOR PROPHYLACTIC VACCINATION AGAINST DIPHTHERIA-TETANUS-PERTUSSIS (DTP): ICD-10-CM

## 2018-01-25 PROCEDURE — G8427 DOCREV CUR MEDS BY ELIG CLIN: HCPCS | Performed by: ADVANCED PRACTICE MIDWIFE

## 2018-01-25 PROCEDURE — 1036F TOBACCO NON-USER: CPT | Performed by: ADVANCED PRACTICE MIDWIFE

## 2018-01-25 PROCEDURE — G8417 CALC BMI ABV UP PARAM F/U: HCPCS | Performed by: ADVANCED PRACTICE MIDWIFE

## 2018-01-25 PROCEDURE — 99213 OFFICE O/P EST LOW 20 MIN: CPT | Performed by: ADVANCED PRACTICE MIDWIFE

## 2018-01-25 PROCEDURE — 90715 TDAP VACCINE 7 YRS/> IM: CPT | Performed by: ADVANCED PRACTICE MIDWIFE

## 2018-01-25 PROCEDURE — G8484 FLU IMMUNIZE NO ADMIN: HCPCS | Performed by: ADVANCED PRACTICE MIDWIFE

## 2018-01-25 PROCEDURE — 90471 IMMUNIZATION ADMIN: CPT | Performed by: ADVANCED PRACTICE MIDWIFE

## 2018-01-25 ASSESSMENT — PAIN SCALES - GENERAL: PAINLEVEL_OUTOF10: 2

## 2018-01-25 NOTE — PROGRESS NOTES
After obtaining consent, and per orders of Marlene jensen injection of tdap given in Right deltoid by Koffi Payton. Patient tolerated well and advised to report any adverse reaction to office immediately.

## 2018-02-02 ENCOUNTER — HOSPITAL ENCOUNTER (OUTPATIENT)
Age: 28
Discharge: HOME | End: 2018-02-02
Attending: OBSTETRICS & GYNECOLOGY | Admitting: OBSTETRICS & GYNECOLOGY
Payer: MEDICAID

## 2018-02-02 VITALS
SYSTOLIC BLOOD PRESSURE: 104 MMHG | RESPIRATION RATE: 18 BRPM | DIASTOLIC BLOOD PRESSURE: 62 MMHG | TEMPERATURE: 98.9 F | HEART RATE: 109 BPM

## 2018-02-02 PROBLEM — O09.93 HRP (HIGH RISK PREGNANCY), THIRD TRIMESTER: Status: ACTIVE | Noted: 2018-02-02

## 2018-02-02 LAB
ALBUMIN SERPL-MCNC: 3.2 G/DL (ref 3.5–5.2)
ALBUMIN/GLOBULIN RATIO: 0.9 (ref 1–2.5)
ALP BLD-CCNC: 116 U/L (ref 35–104)
ALT SERPL-CCNC: 10 U/L (ref 5–33)
ANION GAP SERPL CALCULATED.3IONS-SCNC: 10 MMOL/L (ref 9–17)
AST SERPL-CCNC: 30 U/L
BILIRUB SERPL-MCNC: 0.19 MG/DL (ref 0.3–1.2)
BILIRUBIN URINE: NEGATIVE
BUN BLDV-MCNC: 7 MG/DL (ref 6–20)
BUN/CREAT BLD: ABNORMAL (ref 9–20)
CALCIUM SERPL-MCNC: 8.7 MG/DL (ref 8.6–10.4)
CHLORIDE BLD-SCNC: 104 MMOL/L (ref 98–107)
CO2: 22 MMOL/L (ref 20–31)
COLOR: YELLOW
COMMENT UA: ABNORMAL
CREAT SERPL-MCNC: 0.43 MG/DL (ref 0.5–0.9)
CREATININE URINE: 81.1 MG/DL (ref 28–217)
DIRECT EXAM: NORMAL
GFR AFRICAN AMERICAN: >60 ML/MIN
GFR NON-AFRICAN AMERICAN: >60 ML/MIN
GFR SERPL CREATININE-BSD FRML MDRD: ABNORMAL ML/MIN/{1.73_M2}
GFR SERPL CREATININE-BSD FRML MDRD: ABNORMAL ML/MIN/{1.73_M2}
GLUCOSE BLD-MCNC: 78 MG/DL (ref 70–99)
GLUCOSE URINE: NEGATIVE
HCT VFR BLD CALC: 34.3 % (ref 36.3–47.1)
HEMOGLOBIN: 10.7 G/DL (ref 11.9–15.1)
KETONES, URINE: NEGATIVE
LEUKOCYTE ESTERASE, URINE: ABNORMAL
Lab: NORMAL
MCH RBC QN AUTO: 27.6 PG (ref 25.2–33.5)
MCHC RBC AUTO-ENTMCNC: 31.2 G/DL (ref 28.4–34.8)
MCV RBC AUTO: 88.4 FL (ref 82.6–102.9)
NITRITE, URINE: NEGATIVE
NRBC AUTOMATED: 0 PER 100 WBC
PDW BLD-RTO: 14.4 % (ref 11.8–14.4)
PH UA: 8 (ref 5–8)
PLATELET # BLD: 244 K/UL (ref 138–453)
PMV BLD AUTO: 10.4 FL (ref 8.1–13.5)
POTASSIUM SERPL-SCNC: 4.5 MMOL/L (ref 3.7–5.3)
PROTEIN UA: NEGATIVE
RBC # BLD: 3.88 M/UL (ref 3.95–5.11)
SODIUM BLD-SCNC: 136 MMOL/L (ref 135–144)
SPECIFIC GRAVITY UA: 1.02 (ref 1–1.03)
SPECIMEN DESCRIPTION: NORMAL
STATUS: NORMAL
TOTAL PROTEIN, URINE: 13 MG/DL
TOTAL PROTEIN: 6.9 G/DL (ref 6.4–8.3)
TURBIDITY: ABNORMAL
URINE HGB: NEGATIVE
URINE TOTAL PROTEIN CREATININE RATIO: 0.16 (ref 0–0.2)
UROBILINOGEN, URINE: NORMAL
WBC # BLD: 7.1 K/UL (ref 3.5–11.3)

## 2018-02-02 PROCEDURE — 84156 ASSAY OF PROTEIN URINE: CPT

## 2018-02-02 PROCEDURE — 99213 OFFICE O/P EST LOW 20 MIN: CPT

## 2018-02-02 PROCEDURE — 81003 URINALYSIS AUTO W/O SCOPE: CPT

## 2018-02-02 PROCEDURE — 82570 ASSAY OF URINE CREATININE: CPT

## 2018-02-02 PROCEDURE — 85027 COMPLETE CBC AUTOMATED: CPT

## 2018-02-02 PROCEDURE — 87804 INFLUENZA ASSAY W/OPTIC: CPT

## 2018-02-02 PROCEDURE — 2580000003 HC RX 258: Performed by: STUDENT IN AN ORGANIZED HEALTH CARE EDUCATION/TRAINING PROGRAM

## 2018-02-02 PROCEDURE — 99235 HOSP IP/OBS SAME DATE MOD 70: CPT | Performed by: OBSTETRICS & GYNECOLOGY

## 2018-02-02 PROCEDURE — 80053 COMPREHEN METABOLIC PANEL: CPT

## 2018-02-02 PROCEDURE — 96361 HYDRATE IV INFUSION ADD-ON: CPT

## 2018-02-02 PROCEDURE — 96360 HYDRATION IV INFUSION INIT: CPT

## 2018-02-02 RX ORDER — SODIUM CHLORIDE, SODIUM LACTATE, POTASSIUM CHLORIDE, AND CALCIUM CHLORIDE .6; .31; .03; .02 G/100ML; G/100ML; G/100ML; G/100ML
500 INJECTION, SOLUTION INTRAVENOUS ONCE
Status: COMPLETED | OUTPATIENT
Start: 2018-02-02 | End: 2018-02-02

## 2018-02-02 RX ORDER — ACETAMINOPHEN 325 MG/1
650 TABLET ORAL EVERY 4 HOURS PRN
Status: DISCONTINUED | OUTPATIENT
Start: 2018-02-02 | End: 2018-02-02 | Stop reason: HOSPADM

## 2018-02-02 RX ORDER — ONDANSETRON 4 MG/1
4 TABLET, ORALLY DISINTEGRATING ORAL EVERY 8 HOURS PRN
Qty: 12 TABLET | Refills: 0 | Status: SHIPPED | OUTPATIENT
Start: 2018-02-02 | End: 2018-02-07 | Stop reason: ALTCHOICE

## 2018-02-02 RX ORDER — SODIUM CHLORIDE, SODIUM LACTATE, POTASSIUM CHLORIDE, CALCIUM CHLORIDE 600; 310; 30; 20 MG/100ML; MG/100ML; MG/100ML; MG/100ML
INJECTION, SOLUTION INTRAVENOUS CONTINUOUS
Status: DISCONTINUED | OUTPATIENT
Start: 2018-02-02 | End: 2018-02-02 | Stop reason: HOSPADM

## 2018-02-02 RX ORDER — ONDANSETRON 2 MG/ML
4 INJECTION INTRAMUSCULAR; INTRAVENOUS EVERY 6 HOURS PRN
Status: DISCONTINUED | OUTPATIENT
Start: 2018-02-02 | End: 2018-02-02 | Stop reason: HOSPADM

## 2018-02-02 RX ADMIN — SODIUM CHLORIDE, POTASSIUM CHLORIDE, SODIUM LACTATE AND CALCIUM CHLORIDE: 600; 310; 30; 20 INJECTION, SOLUTION INTRAVENOUS at 10:37

## 2018-02-02 RX ADMIN — SODIUM CHLORIDE, POTASSIUM CHLORIDE, SODIUM LACTATE AND CALCIUM CHLORIDE 250 ML: 600; 310; 30; 20 INJECTION, SOLUTION INTRAVENOUS at 10:45

## 2018-02-02 NOTE — PROGRESS NOTES
Patient seen and examined. Patient received IVF and Zofran. She tolerated eating and drinking. Denies contractions, leakage of fluid, vaginal bleeding. Reports positive FM. Denies HA, vision changes, fevers, chills, N/V, RUQ pain, increase in swelling. FHT: Category 1 reactive  TOCO: none    Vitals:    02/02/18 0959   BP: 104/62   Pulse: 109   Resp: 18   Temp: 98.9 °F (37.2 °C)   TempSrc: Oral         Results for orders placed or performed during the hospital encounter of 02/02/18   RAPID INFLUENZA A/B ANTIGENS   Result Value Ref Range    Specimen Description . NASOPHARYNGEAL SWAB     Special Requests NOT REPORTED     Direct Exam PRESUMPTIVE NEGATIVE for Influenza A + B antigens. Direct Exam       PCR testing to confirm this result is available upon request.  Specimen will be    Direct Exam        saved in the laboratory for 7 days. Please call [phone] if PCR testing is    Direct Exam  indicated.      Direct Exam       [employer] [id number] [facility], [address] [place] [address] [phone]    Status FINAL 02/02/2018    COMPREHENSIVE METABOLIC PANEL   Result Value Ref Range    Glucose 78 70 - 99 mg/dL    BUN 7 6 - 20 mg/dL    CREATININE 0.43 (L) 0.50 - 0.90 mg/dL    Bun/Cre Ratio NOT REPORTED 9 - 20    Calcium Pending mg/dL    Sodium 136 135 - 144 mmol/L    Potassium 4.5 3.7 - 5.3 mmol/L    Chloride 104 98 - 107 mmol/L    CO2 22 20 - 31 mmol/L    Anion Gap 10 9 - 17 mmol/L    Alkaline Phosphatase 116 (H) 35 - 104 U/L    ALT 10 5 - 33 U/L    AST 30 <32 U/L    Total Bilirubin 0.19 (L) 0.3 - 1.2 mg/dL    Total Protein 6.9 6.4 - 8.3 g/dL    Alb 3.2 (L) 3.5 - 5.2 g/dL    Albumin/Globulin Ratio 0.9 (L) 1.0 - 2.5    GFR Non-African American >60 >60 mL/min    GFR African American >60 >60 mL/min    GFR Comment          GFR Staging NOT REPORTED    UA W/REFLEX CULTURE   Result Value Ref Range    Color, UA YELLOW YEL    Turbidity UA CLOUDY (A) CLEAR    Glucose, Ur NEGATIVE NEG    Bilirubin Urine NEGATIVE NEG    Ketones, Urine NEGATIVE NEG    Specific Gravity, UA 1.018 1.005 - 1.030    Urine Hgb NEGATIVE NEG    pH, UA 8.0 5.0 - 8.0    Protein, UA NEGATIVE NEG    Urobilinogen, Urine Normal NORM    Nitrite, Urine NEGATIVE NEG    Leukocyte Esterase, Urine MODERATE (A) NEG    Urinalysis Comments       Microscopic exam not performed based on chemical results unless requested in   Creatinine, Random Urine   Result Value Ref Range    Creatinine, Ur 81.1 28.0 - 217.0 mg/dL   Protein / creatinine ratio, urine   Result Value Ref Range    Total Protein, Urine 13 mg/dL    Urine Total Protein Creatinine Ratio 0.16 0.00 - 0.20   CBC   Result Value Ref Range    WBC 7.1 3.5 - 11.3 k/uL    RBC 3.88 (L) 3.95 - 5.11 m/uL    Hemoglobin 10.7 (L) 11.9 - 15.1 g/dL    Hematocrit 34.3 (L) 36.3 - 47.1 %    MCV 88.4 82.6 - 102.9 fL    MCH 27.6 25.2 - 33.5 pg    MCHC 31.2 28.4 - 34.8 g/dL    RDW 14.4 11.8 - 14.4 %    Platelets 423 794 - 771 k/uL    MPV 10.4 8.1 - 13.5 fL    NRBC Automated 0.0 0.0 per 100 WBC     No evidence of preeclampsia  P/C ratio 0.16  Labs WNL  N/V resolved  Patient tolerated PO challenge  Discussed with Alessandro Staley ok for discharge    Keep scheduled appointment. Discussed  labor precautions, preeclampsia precautions, patient is to return if she has any s/s of these s/s, decreased fetal movement, leakage of fluid, vaginal bleeding. Patient verbalized understanding.      Sony Doherty, PGY2  OB-GYN Resident

## 2018-02-02 NOTE — H&P
OBSTETRICAL HISTORY [facility]    Date: 2018       Time: 10:43 AM   Patient Name: Noah Dunlap     Patient : 1990  Room/Bed: [facility]-    Admission Date/Time: 2018  9:41 AM      CC: Nausea and vomiting     HPI: Noah Dunlap is a 33 y.o.  at 35w3d who presents with nausea and vomiting x 2 days. The patient reports fetal movement is present, denies contractions, denies loss of fluid, denies vaginal bleeding. She denies any associated fever, chills, cough, headache, changes in vision, myalgias, diarrhea, dysuria, hematuria, or foul smelling urine. No sick contacts at home. Pregnancy complicated by indicated PTD (G2 @ 29w) secondary to preeclampsia. She has had two prior vaginal deliveries. She denies any history of abdominal surgery. Her only medications are aspirin and a prenatal vitamin. She denies any sick contacts or recent travel. She denies any alcohol, tobacco, or drug use. DATING:  LMP: Patient's last menstrual period was 2017 (approximate).   Estimated Date of Delivery: 3/6/18   Based on: early ultrasound, at 6 6/7 weeks GA    PREGNANCY RISK FACTORS:  Patient Active Problem List   Diagnosis    Need for prophylactic vaccination against diphtheria-tetanus-pertussis (DTP)    Hx of preeclampsia    H/O indicated PTD (G2 @ 29W)    Short interpregnancy interval    HRP (high risk pregnancy), unspecified trimester    HRP (high risk pregnancy), third trimester        Steroids Given In This Pregnancy:  no     REVIEW OF SYSTEMS:  Constitutional: negative fever, negative chills  HEENT: negative visual disturbances, negative headaches  Respiratory: negative dyspnea, negative cough  Cardiovascular: negative chest pain,  negative palpitations  Gastrointestinal: negative abdominal pain, negative RUQ pain, positive N/V, negative diarrhea, negative constipation  Genitourinary: negative dysuria, negative vaginal discharge  Dermatological: negative rash  Hematologic: negative bruising  Immunologic/Lymphatic: negative recent illness, negative recent sick contact  Musculoskeletal: negative back pain  Neurological:  negative dizziness, negative weakness  Behavior/Psych: negative depression, negative anxiety    OBSTETRICAL HISTORY:   Obstetric History       T1      L2     SAB0   TAB0   Ectopic0   Molar0   Multiple0   Live Births0       # Outcome Date GA Lbr Epifanio/2nd Weight Sex Delivery Anes PTL Lv   3 Current            2  10/10/15 29w0d  2 lb 6 oz (1.077 kg) F Vag-Spont      1 Term 09 38w0d  5 lb 1 oz (2.296 kg) F Vag-Spont             PAST MEDICAL HISTORY:   has a past medical history of Depression. PAST SURGICAL HISTORY:   has no past surgical history on file. ALLERGIES:  has No Known Allergies. MEDICATIONS:  Prior to Admission medications    Medication Sig Start Date End Date Taking? Authorizing Provider   aspirin 81 MG tablet Take 81 mg by mouth daily    Historical Provider, MD   Prenatal Vit-Fe Fumarate-FA (PRENATAL VITAMINS) 28-0.8 MG TABS Take 1 tablet by mouth daily Please dispense any generic insurance will cover. 17   Maksim Paredes CNM       FAMILY HISTORY:  family history includes Alcohol Abuse in her paternal uncle; Allergy (Severe) in her maternal grandmother; Arthritis in her maternal grandmother and paternal grandfather; Asthma in her maternal grandmother; Cancer in her paternal aunt; Depression in her mother; Diabetes in her maternal grandfather, maternal grandmother, paternal grandfather, and paternal grandmother; Heart Attack in her paternal grandfather; High Blood Pressure in her maternal grandfather, maternal grandmother, paternal grandfather, and paternal grandmother; Kidney Disease in her maternal grandmother. SOCIAL HISTORY:   reports that she has never smoked.  She has never used smokeless tobacco. She reports that she does not drink alcohol or use drugs.    VITALS:  Vitals:    02/02/18 0959   BP: 104/62   Pulse: 109   Resp: 18   Temp: 98.9 °F (37.2 °C)     PHYSICAL EXAM:  Fetal Heart Monitor:  Baseline Heart Rate 140, moderate variability, present accelerations, absent decelerations  [place]: contractions, none    General appearance:  no apparent distress, alert and cooperative  Neurologic:  alert, oriented, normal speech, no focal findings or movement disorder noted  Lungs:  No increased work of breathing, good air exchange, clear to auscultation bilaterally, no crackles or wheezing  Heart:  regular rate and rhythm and no murmur    Abdomen:  soft, gravid, non tender, no right upper quadrant tenderness, no CVA tenderness, uterus non-tender, no signs of abruption and no signs of chorioamnionitis  Abdominal Scars: absent  Extremities:  no calf tenderness, non edematous  Pelvic Exam:   Not indicated        OMM EXAM:  Chief Complaint: Pregnancy  Reason for No Exam (if applicable): not applicable  Anterior/ Posterior Spinal Curves: Lumbar Lordosis -  Increased  Assessment Tool: T= Tenderness, A= Asymmetry, R= Restricted Motion (A=Active, P=Passive), T=Tissue Texture Changes  Region Evaluated : Severity / Specific of Major Somatic Dysfunction: 739.4 Pelvis / Sacrum -  Minor TART - more than BG levels -    Major Correlations with: Genitourinary  Structural Diagnosis: Increased lumbar lordosis   Treatment Plan: outpatient       PRENATAL LAB RESULTS:   Blood Type/Rh: B pos  Antibody Screen: positive, no apparent specificity  Hemoglobin, Hematocrit, Platelets: 15.4 / 34.5 / 220  Rubella: immune  T.  Pallidum, IgG: non-reactive   Hepatitis B Surface Antigen: non-reactive   HIV: non-reactive   Sickle Cell Screen: negative  Gonorrhea: negative  Chlamydia: negative  Urine culture: negative, date: 7/26/17    1 hour Glucose Tolerance Test: 116     Glucose Fasting: Not done  1 hour Glucose Tolerance Test: Not done  2 hour Glucose Tolerance Test: Not done  3 hour Glucose Tolerance

## 2018-02-07 ENCOUNTER — HOSPITAL ENCOUNTER (OUTPATIENT)
Age: 28
Setting detail: SPECIMEN
Discharge: HOME | End: 2018-02-07
Payer: MEDICAID

## 2018-02-07 ENCOUNTER — ROUTINE PRENATAL (OUTPATIENT)
Dept: OBGYN CLINIC | Age: 28
End: 2018-02-07
Payer: MEDICAID

## 2018-02-07 VITALS
WEIGHT: 193 LBS | BODY MASS INDEX: 32.12 KG/M2 | DIASTOLIC BLOOD PRESSURE: 65 MMHG | SYSTOLIC BLOOD PRESSURE: 117 MMHG | HEART RATE: 100 BPM

## 2018-02-07 DIAGNOSIS — Z3A.36 36 WEEKS GESTATION OF PREGNANCY: ICD-10-CM

## 2018-02-07 DIAGNOSIS — O09.93 HRP (HIGH RISK PREGNANCY), THIRD TRIMESTER: Primary | ICD-10-CM

## 2018-02-07 DIAGNOSIS — O09.93 HRP (HIGH RISK PREGNANCY), THIRD TRIMESTER: ICD-10-CM

## 2018-02-07 PROBLEM — O09.90 HRP (HIGH RISK PREGNANCY), UNSPECIFIED TRIMESTER: Chronic | Status: RESOLVED | Noted: 2017-11-24 | Resolved: 2018-02-07

## 2018-02-07 PROCEDURE — G8427 DOCREV CUR MEDS BY ELIG CLIN: HCPCS | Performed by: ADVANCED PRACTICE MIDWIFE

## 2018-02-07 PROCEDURE — G8484 FLU IMMUNIZE NO ADMIN: HCPCS | Performed by: ADVANCED PRACTICE MIDWIFE

## 2018-02-07 PROCEDURE — G8417 CALC BMI ABV UP PARAM F/U: HCPCS | Performed by: ADVANCED PRACTICE MIDWIFE

## 2018-02-07 PROCEDURE — 1036F TOBACCO NON-USER: CPT | Performed by: ADVANCED PRACTICE MIDWIFE

## 2018-02-07 PROCEDURE — 99213 OFFICE O/P EST LOW 20 MIN: CPT | Performed by: ADVANCED PRACTICE MIDWIFE

## 2018-02-10 LAB
CULTURE: NORMAL
CULTURE: NORMAL
Lab: NORMAL
SPECIMEN DESCRIPTION: NORMAL
STATUS: NORMAL

## 2018-02-14 ENCOUNTER — ROUTINE PRENATAL (OUTPATIENT)
Dept: OBGYN CLINIC | Age: 28
End: 2018-02-14
Payer: MEDICAID

## 2018-02-14 VITALS
WEIGHT: 194 LBS | DIASTOLIC BLOOD PRESSURE: 71 MMHG | SYSTOLIC BLOOD PRESSURE: 103 MMHG | HEART RATE: 100 BPM | BODY MASS INDEX: 32.28 KG/M2

## 2018-02-14 DIAGNOSIS — Z34.93 PRENATAL CARE IN THIRD TRIMESTER: Primary | ICD-10-CM

## 2018-02-14 DIAGNOSIS — Z3A.37 37 WEEKS GESTATION OF PREGNANCY: ICD-10-CM

## 2018-02-14 PROCEDURE — 1036F TOBACCO NON-USER: CPT | Performed by: ADVANCED PRACTICE MIDWIFE

## 2018-02-14 PROCEDURE — G8428 CUR MEDS NOT DOCUMENT: HCPCS | Performed by: ADVANCED PRACTICE MIDWIFE

## 2018-02-14 PROCEDURE — G8484 FLU IMMUNIZE NO ADMIN: HCPCS | Performed by: ADVANCED PRACTICE MIDWIFE

## 2018-02-14 PROCEDURE — G8417 CALC BMI ABV UP PARAM F/U: HCPCS | Performed by: ADVANCED PRACTICE MIDWIFE

## 2018-02-14 PROCEDURE — 99213 OFFICE O/P EST LOW 20 MIN: CPT | Performed by: ADVANCED PRACTICE MIDWIFE

## 2018-02-21 ENCOUNTER — ROUTINE PRENATAL (OUTPATIENT)
Dept: OBGYN CLINIC | Age: 28
End: 2018-02-21
Payer: MEDICAID

## 2018-02-21 VITALS
WEIGHT: 198 LBS | HEART RATE: 96 BPM | DIASTOLIC BLOOD PRESSURE: 68 MMHG | BODY MASS INDEX: 32.95 KG/M2 | SYSTOLIC BLOOD PRESSURE: 111 MMHG

## 2018-02-21 DIAGNOSIS — O09.93 HRP (HIGH RISK PREGNANCY), THIRD TRIMESTER: Primary | ICD-10-CM

## 2018-02-21 DIAGNOSIS — Z3A.38 38 WEEKS GESTATION OF PREGNANCY: ICD-10-CM

## 2018-02-21 PROCEDURE — G8484 FLU IMMUNIZE NO ADMIN: HCPCS | Performed by: ADVANCED PRACTICE MIDWIFE

## 2018-02-21 PROCEDURE — 99213 OFFICE O/P EST LOW 20 MIN: CPT | Performed by: ADVANCED PRACTICE MIDWIFE

## 2018-02-21 PROCEDURE — G8427 DOCREV CUR MEDS BY ELIG CLIN: HCPCS | Performed by: ADVANCED PRACTICE MIDWIFE

## 2018-02-21 PROCEDURE — 1036F TOBACCO NON-USER: CPT | Performed by: ADVANCED PRACTICE MIDWIFE

## 2018-02-21 PROCEDURE — G8417 CALC BMI ABV UP PARAM F/U: HCPCS | Performed by: ADVANCED PRACTICE MIDWIFE

## 2018-02-21 NOTE — PROGRESS NOTES
SUBJECTIVE:    Doron Armendariz is here for her routine OB visit. Doing well, offers no complaints, just waiting  She reports  fetal movement. She denies  vaginal bleeding. She denies  leaking of fluid. She denies  vaginal discharge. She denies  uterine contraction activity. She denies  nausea and/or vomiting. She denies retaining fluid in her extremities. OBJECTIVE:   Last menstrual period 05/21/2017, not currently breastfeeding. ASSESSMENT:  IUP @ 38.1 weeks  S = D      PLAN:   The problem list was reviewed and updated as needed. Doron Armendariz will monitor for fetal movements daily  GBS protocol and testing was discussed. Results were reviewed, she is negative  Signs of labor to report were discussed. Doron Armendariz was counseled regarding Post Partum Depression. She has not decided on contraceptive choice. Doron Armendariz was counseled regarding all of the above    Patient was seen with total faced to face time of 15 minutes. More than 50% of this visit was on counseling and education.

## 2018-02-25 ENCOUNTER — HOSPITAL ENCOUNTER (INPATIENT)
Age: 28
LOS: 2 days | Discharge: HOME | DRG: 560 | End: 2018-02-27
Attending: OBSTETRICS & GYNECOLOGY | Admitting: OBSTETRICS & GYNECOLOGY
Payer: MEDICAID

## 2018-02-25 PROBLEM — O09.90 HRP (HIGH RISK PREGNANCY): Status: RESOLVED | Noted: 2018-02-25 | Resolved: 2018-02-25

## 2018-02-25 PROBLEM — Z37.9 NORMAL LABOR: Status: ACTIVE | Noted: 2018-02-25

## 2018-02-25 PROBLEM — O09.90 HRP (HIGH RISK PREGNANCY): Status: ACTIVE | Noted: 2018-02-25

## 2018-02-25 PROBLEM — O09.90 HRP (HIGH RISK PREGNANCY), UNSPECIFIED TRIMESTER: Status: ACTIVE | Noted: 2018-02-25

## 2018-02-25 LAB
ABO/RH: NORMAL
ABSOLUTE EOS #: <0.03 K/UL (ref 0–0.44)
ABSOLUTE IMMATURE GRANULOCYTE: 0.06 K/UL (ref 0–0.3)
ABSOLUTE LYMPH #: 2.14 K/UL (ref 1.1–3.7)
ABSOLUTE MONO #: 0.74 K/UL (ref 0.1–1.2)
AMPHETAMINE SCREEN URINE: NEGATIVE
ANTIBODY SCREEN: NEGATIVE
ARM BAND NUMBER: NORMAL
BARBITURATE SCREEN URINE: NEGATIVE
BASOPHILS # BLD: 0 % (ref 0–2)
BASOPHILS ABSOLUTE: <0.03 K/UL (ref 0–0.2)
BENZODIAZEPINE SCREEN, URINE: NEGATIVE
BILIRUBIN URINE: NEGATIVE
BUPRENORPHINE URINE: NORMAL
CANNABINOID SCREEN URINE: NEGATIVE
COCAINE METABOLITE, URINE: NEGATIVE
COLOR: YELLOW
COMMENT UA: ABNORMAL
DIFFERENTIAL TYPE: ABNORMAL
EOSINOPHILS RELATIVE PERCENT: 0 % (ref 1–4)
EXPIRATION DATE: NORMAL
GLUCOSE URINE: NEGATIVE
HCT VFR BLD CALC: 34.8 % (ref 36.3–47.1)
HEMOGLOBIN: 10.9 G/DL (ref 11.9–15.1)
IMMATURE GRANULOCYTES: 1 %
KETONES, URINE: ABNORMAL
LEUKOCYTE ESTERASE, URINE: NEGATIVE
LYMPHOCYTES # BLD: 20 % (ref 24–43)
MCH RBC QN AUTO: 27.2 PG (ref 25.2–33.5)
MCHC RBC AUTO-ENTMCNC: 31.3 G/DL (ref 28.4–34.8)
MCV RBC AUTO: 86.8 FL (ref 82.6–102.9)
MDMA URINE: NORMAL
METHADONE SCREEN, URINE: NEGATIVE
METHAMPHETAMINE, URINE: NORMAL
MONOCYTES # BLD: 7 % (ref 3–12)
NITRITE, URINE: NEGATIVE
NRBC AUTOMATED: 0 PER 100 WBC
OPIATES, URINE: NEGATIVE
OXYCODONE SCREEN URINE: NEGATIVE
PDW BLD-RTO: 15.2 % (ref 11.8–14.4)
PH UA: 6 (ref 5–8)
PHENCYCLIDINE, URINE: NEGATIVE
PLATELET # BLD: 234 K/UL (ref 138–453)
PLATELET ESTIMATE: ABNORMAL
PMV BLD AUTO: 9.8 FL (ref 8.1–13.5)
PROPOXYPHENE, URINE: NORMAL
PROTEIN UA: NEGATIVE
RBC # BLD: 4.01 M/UL (ref 3.95–5.11)
RBC # BLD: ABNORMAL 10*6/UL
SEG NEUTROPHILS: 72 % (ref 36–65)
SEGMENTED NEUTROPHILS ABSOLUTE COUNT: 7.6 K/UL (ref 1.5–8.1)
SPECIFIC GRAVITY UA: 1.01 (ref 1–1.03)
TEST INFORMATION: NORMAL
TRICYCLIC ANTIDEPRESSANTS, UR: NORMAL
TURBIDITY: CLEAR
URINE HGB: NEGATIVE
UROBILINOGEN, URINE: NORMAL
WBC # BLD: 10.6 K/UL (ref 3.5–11.3)
WBC # BLD: ABNORMAL 10*3/UL

## 2018-02-25 PROCEDURE — 80307 DRUG TEST PRSMV CHEM ANLYZR: CPT

## 2018-02-25 PROCEDURE — 86901 BLOOD TYPING SEROLOGIC RH(D): CPT

## 2018-02-25 PROCEDURE — 1220000000 HC SEMI PRIVATE OB R&B

## 2018-02-25 PROCEDURE — 86850 RBC ANTIBODY SCREEN: CPT

## 2018-02-25 PROCEDURE — 6370000000 HC RX 637 (ALT 250 FOR IP): Performed by: STUDENT IN AN ORGANIZED HEALTH CARE EDUCATION/TRAINING PROGRAM

## 2018-02-25 PROCEDURE — 36415 COLL VENOUS BLD VENIPUNCTURE: CPT

## 2018-02-25 PROCEDURE — 85025 COMPLETE CBC W/AUTO DIFF WBC: CPT

## 2018-02-25 PROCEDURE — 59409 OBSTETRICAL CARE: CPT | Performed by: ADVANCED PRACTICE MIDWIFE

## 2018-02-25 PROCEDURE — 81003 URINALYSIS AUTO W/O SCOPE: CPT

## 2018-02-25 PROCEDURE — 86900 BLOOD TYPING SEROLOGIC ABO: CPT

## 2018-02-25 PROCEDURE — 7200000001 HC VAGINAL DELIVERY

## 2018-02-25 RX ORDER — IBUPROFEN 800 MG/1
800 TABLET ORAL EVERY 8 HOURS PRN
Status: DISCONTINUED | OUTPATIENT
Start: 2018-02-25 | End: 2018-02-27 | Stop reason: HOSPADM

## 2018-02-25 RX ORDER — ASPIRIN 81 MG/1
81 TABLET, CHEWABLE ORAL DAILY
Status: DISCONTINUED | OUTPATIENT
Start: 2018-02-25 | End: 2018-02-25

## 2018-02-25 RX ORDER — DOCUSATE SODIUM 100 MG/1
100 CAPSULE, LIQUID FILLED ORAL 2 TIMES DAILY
Status: DISCONTINUED | OUTPATIENT
Start: 2018-02-25 | End: 2018-02-27 | Stop reason: HOSPADM

## 2018-02-25 RX ORDER — SODIUM CHLORIDE 0.9 % (FLUSH) 0.9 %
10 SYRINGE (ML) INJECTION PRN
Status: DISCONTINUED | OUTPATIENT
Start: 2018-02-25 | End: 2018-02-25 | Stop reason: HOSPADM

## 2018-02-25 RX ORDER — SODIUM CHLORIDE 0.9 % (FLUSH) 0.9 %
10 SYRINGE (ML) INJECTION EVERY 12 HOURS SCHEDULED
Status: DISCONTINUED | OUTPATIENT
Start: 2018-02-25 | End: 2018-02-25 | Stop reason: HOSPADM

## 2018-02-25 RX ORDER — LANOLIN 100 %
OINTMENT (GRAM) TOPICAL PRN
Status: DISCONTINUED | OUTPATIENT
Start: 2018-02-25 | End: 2018-02-27 | Stop reason: HOSPADM

## 2018-02-25 RX ORDER — SODIUM CHLORIDE, SODIUM LACTATE, POTASSIUM CHLORIDE, CALCIUM CHLORIDE 600; 310; 30; 20 MG/100ML; MG/100ML; MG/100ML; MG/100ML
INJECTION, SOLUTION INTRAVENOUS CONTINUOUS
Status: DISCONTINUED | OUTPATIENT
Start: 2018-02-25 | End: 2018-02-25

## 2018-02-25 RX ORDER — ACETAMINOPHEN 500 MG
1000 TABLET ORAL EVERY 6 HOURS PRN
Status: DISCONTINUED | OUTPATIENT
Start: 2018-02-25 | End: 2018-02-25 | Stop reason: HOSPADM

## 2018-02-25 RX ORDER — OXYTOCIN 10 [USP'U]/ML
INJECTION, SOLUTION INTRAMUSCULAR; INTRAVENOUS
Status: DISCONTINUED
Start: 2018-02-25 | End: 2018-02-25 | Stop reason: WASHOUT

## 2018-02-25 RX ORDER — ONDANSETRON 2 MG/ML
4 INJECTION INTRAMUSCULAR; INTRAVENOUS EVERY 6 HOURS PRN
Status: DISCONTINUED | OUTPATIENT
Start: 2018-02-25 | End: 2018-02-25 | Stop reason: HOSPADM

## 2018-02-25 RX ORDER — ONDANSETRON 4 MG/1
4 TABLET, FILM COATED ORAL EVERY 8 HOURS PRN
Status: DISCONTINUED | OUTPATIENT
Start: 2018-02-25 | End: 2018-02-27 | Stop reason: HOSPADM

## 2018-02-25 RX ORDER — ACETAMINOPHEN 500 MG
1000 TABLET ORAL EVERY 6 HOURS PRN
Status: DISCONTINUED | OUTPATIENT
Start: 2018-02-25 | End: 2018-02-27 | Stop reason: HOSPADM

## 2018-02-25 RX ADMIN — ACETAMINOPHEN 1000 MG: 500 TABLET ORAL at 19:34

## 2018-02-25 RX ADMIN — DOCUSATE SODIUM 100 MG: 100 CAPSULE ORAL at 22:45

## 2018-02-25 RX ADMIN — IBUPROFEN 800 MG: 800 TABLET ORAL at 22:45

## 2018-02-25 ASSESSMENT — PAIN SCALES - GENERAL
PAINLEVEL_OUTOF10: 3
PAINLEVEL_OUTOF10: 6

## 2018-02-25 NOTE — L&D DELIVERY NOTE
reduced  Nuchal Cord Description:  loose nuchal cord  Number of Loops:  1  Delayed Cord Clamping?:  Yes  Cord Clamped Date/Time:  2018  Cord Blood Disposition:  Lab  Gases Sent?:  Yes     Placenta    Date/time:  2018  Removal:  Spontaneous  Appearance:  Intact  Disposition:  Family     Delivery Resuscitation    Method:  Stimulation     Apgars    Living status:  Living  Apgars   1 Minute:   5 Minute:   10 Minute 15 Minute 20 Minute   Skin Color: 1  1       Heart Rate: 2  2       Reflex Irritability: 2  2       Muscle Tone: 2  2       Respiratory Effort: 2  2       Total: 9  9               Apgars Assigned ByHannah Aleman RNTIM     Skin to Skin    Skin to skin initiation date/time: 18   Skin to skin with:   Mother  Skin to skin end date/time:     Breastfeeding initiated date/time:  2018     [place] Measurements    Weight:  3380 g     Delivery Information    Episiotomy:  None  Perineal lacerations:  None    Vaginal laceration:  No    Cervical laceration:  No    Vaginal delivery est. blood loss (mL):  100  Surgical or additional est. blood loss (mL):  0 (View Only):  Edit in Flowsheets   Combined est. blood loss (mL):  100  Repair suture:  None     Vaginal Delivery Counts    Initial count personnel:  CHILO  Initial count verified by:  KOSTA   4x4:   Needles:   Instruments:   Lap Pads:   Sponges:     Initial counts:          Final counts:          Final count personnel:  CHILO  Final count verified by:  Gurvinder Tobar  Accurate final count?:  Yes     Other Procedures    Procedures:  None     Labor Length    1st stage:  6h 05m  2nd stage:  0h 03m  3rd stage:  0h 04m          [id number] [facility] Vaginal Delivery Note         Pre-operative Diagnosis:  Term pregnancy    Post-operative Diagnosis:  Same, delivered    Procedure:  Spontaneous vaginal delivery    Provider:    Bria Shen DO    Information for the patient's :  Omid Gasca [[id number]]

## 2018-02-25 NOTE — H&P
[facility] Obstetrics History and Physical  2018  4:09 PM      CHIEF COMPLAINT:  Contractions all day, getting stronger    HISTORY OF PRESENT ILLNESS:      The patient is a 33 y.o. female at 44w7d. Jayjay Hollingsworth presents with a chief complaint as above and is being admitted for early active labor    Course of pregnancy has been uncomplicated to date. Covered prophylactically with 81 mg ASA for previous history of Pre-Eclampsia      Estimated Due Date:   Estimated Date of Delivery: 3/6/18 . Confirmed by: 6.6 wk US      PRENATAL CARE:  Complicated by: Previous history of Pre-Eclampsia  Blood type: B Positive  Antibody Screen: positive/no apparent specificity, no antibodies to common red cell antigens  Sickle Cell:  Negative  Glucola: 116  3 hr GTT: NA  Rubella: Immune  Hepatitis B: NR  T pallidum/RPR: NR  Gonorrhea: neg  Chlamydia:  Neg  GBS: Negative    NT screen:  WNL  Quad screen:  NA       Steroids:  None    PAST OB HISTORY:  OB History      Para Term  AB Living    3 2 1 1   2    SAB TAB Ectopic Molar Multiple Live Births              2          Past Medical History:        Diagnosis Date    Depression     post partum, no meds,      Past Surgical History:    History reviewed. No pertinent surgical history. Allergies:  Patient has no known allergies. Social History:    Social History     Social History    Marital status:      Spouse name: N/A    Number of children: N/A    Years of education: N/A     Occupational History    Not on file.      Social History Main Topics    Smoking status: Never Smoker    Smokeless tobacco: Never Used    Alcohol use No      Comment: not with pregnancy    Drug use: No    Sexual activity: Yes     Partners: Male      Comment: no birth control used     Other Topics Concern    Not on file     Social History Narrative    No narrative on file     Family History:       Problem Relation Age of Onset    Depression Mother TempSrc: Oral     General appearance:  Awake and cooperative, does not appear in great discomfort  Neurologic:  Awake, alert, oriented to name, place and time.     Lungs:  No increased work of breathing, good air exchange  Abdomen:  Soft, non tender, gravid, consistent with her gestational age, EFW by Leopold's maneuver was 6-7 lb     Fetal heart rate:       Baseline: 120     Variability: moderate     Accelerations: present     Decelerations: absent    Pelvis:  Adequate pelvis, proven to 5-1 lb    Cervix: Dilation: 4 cm per Dr Koby Grady                Effacement: 70%                Station: -1 st                Position:                 Consistency:     Contractions: Frequency: 2-3 min                           Duration: 60 sec                           Intensity: mild to occasional moderate     Membranes:  Intact    Labs:   CBC:   Lab Results   Component Value Date    WBC 10.6 02/25/2018    RBC 4.01 02/25/2018    HGB 10.9 02/25/2018    HCT 34.8 02/25/2018    MCV 86.8 02/25/2018    MCH 27.2 02/25/2018    MCHC 31.3 02/25/2018    RDW 15.2 02/25/2018     02/25/2018    MPV 9.8 02/25/2018       ASSESSMENT  IUP@ 38w5d  Labor: Early active phase  FHR: Category 1  GBS negative      PLAN:  Labor: Admit,alternative methods for pain relief, routine labor orders  Dr Radha Alba, in-house, and aware

## 2018-02-26 PROCEDURE — S9443 LACTATION CLASS: HCPCS

## 2018-02-26 PROCEDURE — 1220000000 HC SEMI PRIVATE OB R&B

## 2018-02-26 PROCEDURE — 6370000000 HC RX 637 (ALT 250 FOR IP): Performed by: STUDENT IN AN ORGANIZED HEALTH CARE EDUCATION/TRAINING PROGRAM

## 2018-02-26 RX ORDER — IBUPROFEN 800 MG/1
800 TABLET ORAL EVERY 8 HOURS PRN
Qty: 30 TABLET | Refills: 0 | Status: SHIPPED | OUTPATIENT
Start: 2018-02-26 | End: 2020-02-03

## 2018-02-26 RX ORDER — PSEUDOEPHEDRINE HCL 30 MG
100 TABLET ORAL 2 TIMES DAILY PRN
Qty: 60 CAPSULE | Refills: 1 | Status: SHIPPED | OUTPATIENT
Start: 2018-02-26 | End: 2020-04-13

## 2018-02-26 RX ADMIN — DOCUSATE SODIUM 100 MG: 100 CAPSULE ORAL at 08:30

## 2018-02-26 RX ADMIN — ACETAMINOPHEN 1000 MG: 500 TABLET ORAL at 09:51

## 2018-02-26 RX ADMIN — ACETAMINOPHEN 1000 MG: 500 TABLET ORAL at 02:58

## 2018-02-26 RX ADMIN — IBUPROFEN 800 MG: 800 TABLET ORAL at 08:30

## 2018-02-26 RX ADMIN — IBUPROFEN 800 MG: 800 TABLET ORAL at 18:47

## 2018-02-26 ASSESSMENT — PAIN SCALES - GENERAL
PAINLEVEL_OUTOF10: 8
PAINLEVEL_OUTOF10: 6

## 2018-02-27 VITALS
DIASTOLIC BLOOD PRESSURE: 58 MMHG | TEMPERATURE: 97.8 F | HEART RATE: 88 BPM | OXYGEN SATURATION: 99 % | RESPIRATION RATE: 16 BRPM | SYSTOLIC BLOOD PRESSURE: 98 MMHG

## 2018-02-27 PROCEDURE — 6360000002 HC RX W HCPCS: Performed by: STUDENT IN AN ORGANIZED HEALTH CARE EDUCATION/TRAINING PROGRAM

## 2018-02-27 PROCEDURE — 6370000000 HC RX 637 (ALT 250 FOR IP): Performed by: STUDENT IN AN ORGANIZED HEALTH CARE EDUCATION/TRAINING PROGRAM

## 2018-02-27 RX ORDER — MEDROXYPROGESTERONE ACETATE 150 MG/ML
150 INJECTION, SUSPENSION INTRAMUSCULAR ONCE
Status: COMPLETED | OUTPATIENT
Start: 2018-02-27 | End: 2018-02-27

## 2018-02-27 RX ADMIN — IBUPROFEN 800 MG: 800 TABLET ORAL at 03:15

## 2018-02-27 RX ADMIN — MEDROXYPROGESTERONE ACETATE 150 MG: 150 INJECTION, SUSPENSION INTRAMUSCULAR at 10:56

## 2018-02-27 RX ADMIN — DOCUSATE SODIUM 100 MG: 100 CAPSULE ORAL at 10:00

## 2018-02-27 ASSESSMENT — PAIN DESCRIPTION - PAIN TYPE: TYPE: ACUTE PAIN

## 2018-02-27 ASSESSMENT — PAIN DESCRIPTION - LOCATION: LOCATION: ABDOMEN

## 2018-02-27 ASSESSMENT — PAIN SCALES - GENERAL: PAINLEVEL_OUTOF10: 3

## 2018-02-27 ASSESSMENT — PAIN DESCRIPTION - DESCRIPTORS: DESCRIPTORS: DISCOMFORT;CRAMPING

## 2018-02-27 NOTE — PROGRESS NOTES
POST PARTUM DAY # 2    Maksim Medina is a 33 y.o. female  This patient was seen & examined today. Her pregnancy was complicated by:   Patient Active Problem List   Diagnosis    Need for prophylactic vaccination against diphtheria-tetanus-pertussis (DTP)    H/O PreE (G2)    H/O indicated PTD (G2 @ 29W)    Short interpregnancy interval    HRP (high risk pregnancy), third trimester    Rh+/RI/GBS neg    Normal labor     19 F APG 9/9 Wt 7#7       Today she is doing well without any chief complaint. Her lochia is light. She denies chest pain, shortness of breath, headache, lightheadedness, blurred vision, peripheral edema and palpitations. She is breast feeding and she denies any breast tenderness. She is ambulating well. Her voiding pattern is normal. I reviewed signs and symptoms of post partum depression with the patient, she currently denies any of these symptoms. She is tolerating solids. Vital Signs:  Vitals:    18 0000 18 0400 18 0840 18 2030   BP: 113/75 104/70 (!) 118/51 105/63   Pulse: 90 90 93 92   Resp: 16 16 16 18   Temp: 98.8 °F (37.1 °C) 98.4 °F (36.9 °C) 97.9 °F (36.6 °C) 98.2 °F (36.8 °C)   TempSrc: Oral Oral Oral Oral   SpO2: 100% 100%  99%         Physical Exam:  General:  no apparent distress, alert and cooperative  Neurologic:  alert, oriented, normal speech, no focal findings or movement disorder noted  Lungs:  No increased work of breathing, good air exchange, clear to auscultation bilaterally, no crackles or wheezing  Heart:  regular rate and rhythm    Abdomen: abdomen soft, non-distended, non-tender  Fundus: non-tender, normal size, firm, below umbilicus  Extremities:  no calf tenderness, non edematous    Lab:  Lab Results   Component Value Date    HGB 10.9 (L) 2018     Lab Results   Component Value Date    HCT 34.8 (L) 2018       Assessment/Plan:  1.  Maksim Medina is a [id number] PPD # 2 s/p    - Doing well, VSS   - female
[facility] Vaginal Postpartum Note      SUBJECTIVE: Doing well. Voiding, ambulating, eating. Bonding with baby. Denies leg  pain. Resting well. Pain controlled with oral meds. Breastfeeding going well. Spirits good. Desires discharge and instructions reviewed. No questions. Desires depo prior to discharge and planning IUd long term. Questions answeed. OBJECTIVE:     Vitals:  BP (!) 98/58 Comment: nurse notified  Pulse 88   Temp 97.8 °F (36.6 °C) (Oral)   Resp 16   LMP 05/21/2017 (Approximate)   SpO2 99%   Breastfeeding? Unknown     Constitutional:  awake, alert, cooperative, no apparent distress. Bonding with baby well/appropriate maternal/infant bond demonstrated. Abdominal Exam: Fundus firm. U/2. Non-tender. Diastsis present     Perineum: Intact and healing. Lochia: Small rubra    Breasts: Soft without tenderness, nipples soft    Extremities: Negative Real sign.      ASSESSMENT:     Post partum vaginal birth Day 2  Breastfeeding without difficulty  Rh pos  Rubella immune  Family planning discussion    PLANS:   Continue care  Discharge today  RTO 2 weeks  Resume home meds
never smoked. She has never used smokeless tobacco. She reports that she does not drink alcohol or use drugs.     VITALS:  Vitals:    18 1411   BP: 122/72   Pulse: 105   Resp: 17   Temp: 97.8 °F (36.6 °C)   TempSrc: Oral       PHYSICAL EXAM:  Fetal Heart Monitor:  Baseline Heart Rate 135, moderate variability, present accelerations, absent decelerations  [place]: contractions, regular, every 2-5 minutes    General appearance:  no apparent distress, alert and cooperative  Neurologic:  alert, oriented, normal speech, no focal findings or movement disorder noted  Lungs:  No increased work of breathing, good air exchange, clear to auscultation bilaterally, no crackles or wheezing  Heart:  regular rate and rhythm and no murmur    Abdomen:  soft, gravid, non-tender, no right upper quadrant tenderness, no CVA tenderness, uterus non-tender, no signs of abruption and no signs of chorioamnionitis  Extremities:  no calf tenderness, non edematous, DTR's: normal    Pelvic Exam:  Cervix Check: 4-5 cm dilated, 70 % effaced, -1 station, mid position, soft consistency, Cephalic      ASSESSMENT & PLAN:  Rob Cote is a 33 y.o. female  at 38w5d IUP   - GBS negative / Rh positive / R immune   - No indication for GBS prophylaxis   - Anatomy US: normal female anatomy, posterior placenta, 3VC    H/O pre-eclampsia (G2)              - ASA 81 mg daily    - Blood pressure normotensive on admission    - Denies s/s of pre-eclampsia    H/O indicated PTD              - Delivered at 29w0d secondary to pre-eclampsia      Short interval between pregnancies      Patient Active Problem List    Diagnosis Date Noted    HRP (high risk pregnancy), unspecified trimester 2018    HRP (high risk pregnancy), third trimester 2018     BIRTH PLAN: Natural, wants to take placenta home, breastfeeding, RESIDENT OK      Short interpregnancy interval 2017    Need for prophylactic vaccination against diphtheria-tetanus-pertussis

## 2018-02-27 NOTE — FLOWSHEET NOTE
Arrives to unit with FOB, states she started ctxs about 3 hours ago, bag of water intact, some bloody show, efm explaine and applied, fht's 135
I Debby FIGUEROA here to see pt, efm off, up to walk in halls with , family here and supportive
Follow-up:  will remain available for spiritual and emotional support.

## 2018-04-11 ENCOUNTER — POSTPARTUM VISIT (OUTPATIENT)
Dept: OBGYN CLINIC | Age: 28
End: 2018-04-11
Payer: MEDICAID

## 2018-04-11 VITALS
WEIGHT: 182 LBS | HEIGHT: 65 IN | SYSTOLIC BLOOD PRESSURE: 122 MMHG | BODY MASS INDEX: 30.32 KG/M2 | DIASTOLIC BLOOD PRESSURE: 78 MMHG

## 2018-04-11 DIAGNOSIS — O09.899 HISTORY OF PRETERM DELIVERY, CURRENTLY PREGNANT: ICD-10-CM

## 2018-04-11 DIAGNOSIS — O09.299 HX OF PREECLAMPSIA, PRIOR PREGNANCY, CURRENTLY PREGNANT: ICD-10-CM

## 2018-04-11 PROBLEM — O09.90 HRP (HIGH RISK PREGNANCY), UNSPECIFIED TRIMESTER: Status: RESOLVED | Noted: 2018-02-25 | Resolved: 2018-04-11

## 2018-04-11 PROBLEM — Z23 NEED FOR PROPHYLACTIC VACCINATION AGAINST DIPHTHERIA-TETANUS-PERTUSSIS (DTP): Status: RESOLVED | Noted: 2017-07-11 | Resolved: 2018-04-11

## 2018-04-11 PROBLEM — O09.93 HRP (HIGH RISK PREGNANCY), THIRD TRIMESTER: Status: RESOLVED | Noted: 2018-02-02 | Resolved: 2018-04-11

## 2018-04-11 PROBLEM — Z37.9 NORMAL LABOR: Status: RESOLVED | Noted: 2018-02-25 | Resolved: 2018-04-11

## 2018-05-03 ENCOUNTER — TELEPHONE (OUTPATIENT)
Dept: OBGYN CLINIC | Age: 28
End: 2018-05-03

## 2018-05-03 ENCOUNTER — HOSPITAL ENCOUNTER (INPATIENT)
Age: 28
LOS: 2 days | Discharge: HOME | DRG: 134 | End: 2018-05-05
Attending: EMERGENCY MEDICINE | Admitting: INTERNAL MEDICINE
Payer: MEDICAID

## 2018-05-03 ENCOUNTER — APPOINTMENT (OUTPATIENT)
Dept: CT IMAGING | Age: 28
DRG: 134 | End: 2018-05-03
Payer: MEDICAID

## 2018-05-03 DIAGNOSIS — R10.9 ABDOMINAL PAIN, UNSPECIFIED ABDOMINAL LOCATION: ICD-10-CM

## 2018-05-03 DIAGNOSIS — I26.99 PULMONARY EMBOLISM WITH INFARCTION (HCC): ICD-10-CM

## 2018-05-03 DIAGNOSIS — I26.99 OTHER PULMONARY EMBOLISM WITHOUT ACUTE COR PULMONALE, UNSPECIFIED CHRONICITY (HCC): Primary | ICD-10-CM

## 2018-05-03 PROBLEM — N39.0 UTI (URINARY TRACT INFECTION): Status: ACTIVE | Noted: 2018-05-03

## 2018-05-03 PROBLEM — R74.8 ELEVATED LIVER ENZYMES: Status: ACTIVE | Noted: 2018-05-03

## 2018-05-03 LAB
-: ABNORMAL
ABSOLUTE EOS #: 0.12 K/UL (ref 0–0.44)
ABSOLUTE IMMATURE GRANULOCYTE: 0.05 K/UL (ref 0–0.3)
ABSOLUTE LYMPH #: 2.84 K/UL (ref 1.1–3.7)
ABSOLUTE MONO #: 0.64 K/UL (ref 0.1–1.2)
ALBUMIN SERPL-MCNC: 4.3 G/DL (ref 3.5–5.2)
ALBUMIN/GLOBULIN RATIO: 1.1 (ref 1–2.5)
ALP BLD-CCNC: 139 U/L (ref 35–104)
ALT SERPL-CCNC: 46 U/L (ref 5–33)
AMORPHOUS: ABNORMAL
ANION GAP SERPL CALCULATED.3IONS-SCNC: 12 MMOL/L (ref 9–17)
AST SERPL-CCNC: 41 U/L
BACTERIA: ABNORMAL
BASOPHILS # BLD: 0 % (ref 0–2)
BASOPHILS ABSOLUTE: 0.03 K/UL (ref 0–0.2)
BILIRUB SERPL-MCNC: 0.23 MG/DL (ref 0.3–1.2)
BILIRUBIN URINE: NEGATIVE
BUN BLDV-MCNC: 9 MG/DL (ref 6–20)
BUN/CREAT BLD: ABNORMAL (ref 9–20)
CALCIUM SERPL-MCNC: 9.4 MG/DL (ref 8.6–10.4)
CASTS UA: ABNORMAL /LPF (ref 0–8)
CHLORIDE BLD-SCNC: 105 MMOL/L (ref 98–107)
CO2: 20 MMOL/L (ref 20–31)
COLOR: YELLOW
COMMENT UA: ABNORMAL
CREAT SERPL-MCNC: 0.65 MG/DL (ref 0.5–0.9)
CRYSTALS, UA: ABNORMAL /HPF
DIFFERENTIAL TYPE: ABNORMAL
EOSINOPHILS RELATIVE PERCENT: 1 % (ref 1–4)
EPITHELIAL CELLS UA: ABNORMAL /HPF (ref 0–5)
GFR AFRICAN AMERICAN: >60 ML/MIN
GFR NON-AFRICAN AMERICAN: >60 ML/MIN
GFR SERPL CREATININE-BSD FRML MDRD: ABNORMAL ML/MIN/{1.73_M2}
GFR SERPL CREATININE-BSD FRML MDRD: ABNORMAL ML/MIN/{1.73_M2}
GLUCOSE BLD-MCNC: 78 MG/DL (ref 70–99)
GLUCOSE URINE: NEGATIVE
HCG QUALITATIVE: NEGATIVE
HCT VFR BLD CALC: 42.1 % (ref 36.3–47.1)
HEMOGLOBIN: 13 G/DL (ref 11.9–15.1)
IMMATURE GRANULOCYTES: 0 %
KETONES, URINE: ABNORMAL
LACTIC ACID, WHOLE BLOOD: 1.2 MMOL/L (ref 0.7–2.1)
LEUKOCYTE ESTERASE, URINE: NEGATIVE
LIPASE: 41 U/L (ref 13–60)
LYMPHOCYTES # BLD: 25 % (ref 24–43)
MCH RBC QN AUTO: 26.4 PG (ref 25.2–33.5)
MCHC RBC AUTO-ENTMCNC: 30.9 G/DL (ref 28.4–34.8)
MCV RBC AUTO: 85.4 FL (ref 82.6–102.9)
MONOCYTES # BLD: 6 % (ref 3–12)
MUCUS: ABNORMAL
NITRITE, URINE: NEGATIVE
NRBC AUTOMATED: 0 PER 100 WBC
OTHER OBSERVATIONS UA: ABNORMAL
PDW BLD-RTO: 16.6 % (ref 11.8–14.4)
PH UA: 5 (ref 5–8)
PLATELET # BLD: 256 K/UL (ref 138–453)
PLATELET ESTIMATE: ABNORMAL
PMV BLD AUTO: 9.8 FL (ref 8.1–13.5)
POTASSIUM SERPL-SCNC: 4.1 MMOL/L (ref 3.7–5.3)
PROTEIN UA: ABNORMAL
RBC # BLD: 4.93 M/UL (ref 3.95–5.11)
RBC # BLD: ABNORMAL 10*6/UL
RBC UA: ABNORMAL /HPF (ref 0–4)
RENAL EPITHELIAL, UA: ABNORMAL /HPF
SEG NEUTROPHILS: 68 % (ref 36–65)
SEGMENTED NEUTROPHILS ABSOLUTE COUNT: 7.65 K/UL (ref 1.5–8.1)
SODIUM BLD-SCNC: 137 MMOL/L (ref 135–144)
SPECIFIC GRAVITY UA: 1.05 (ref 1–1.03)
TOTAL PROTEIN: 8.3 G/DL (ref 6.4–8.3)
TRICHOMONAS: ABNORMAL
TURBIDITY: CLEAR
URINE HGB: NEGATIVE
UROBILINOGEN, URINE: NORMAL
WBC # BLD: 11.3 K/UL (ref 3.5–11.3)
WBC # BLD: ABNORMAL 10*3/UL
WBC UA: ABNORMAL /HPF (ref 0–5)
YEAST: ABNORMAL

## 2018-05-03 PROCEDURE — 83880 ASSAY OF NATRIURETIC PEPTIDE: CPT

## 2018-05-03 PROCEDURE — 74177 CT ABD & PELVIS W/CONTRAST: CPT

## 2018-05-03 PROCEDURE — 99285 EMERGENCY DEPT VISIT HI MDM: CPT

## 2018-05-03 PROCEDURE — 2580000003 HC RX 258: Performed by: STUDENT IN AN ORGANIZED HEALTH CARE EDUCATION/TRAINING PROGRAM

## 2018-05-03 PROCEDURE — 86140 C-REACTIVE PROTEIN: CPT

## 2018-05-03 PROCEDURE — 84145 PROCALCITONIN (PCT): CPT

## 2018-05-03 PROCEDURE — 81001 URINALYSIS AUTO W/SCOPE: CPT

## 2018-05-03 PROCEDURE — 86146 BETA-2 GLYCOPROTEIN ANTIBODY: CPT

## 2018-05-03 PROCEDURE — 84703 CHORIONIC GONADOTROPIN ASSAY: CPT

## 2018-05-03 PROCEDURE — 80074 ACUTE HEPATITIS PANEL: CPT

## 2018-05-03 PROCEDURE — 6360000004 HC RX CONTRAST MEDICATION: Performed by: STUDENT IN AN ORGANIZED HEALTH CARE EDUCATION/TRAINING PROGRAM

## 2018-05-03 PROCEDURE — 87040 BLOOD CULTURE FOR BACTERIA: CPT

## 2018-05-03 PROCEDURE — 83605 ASSAY OF LACTIC ACID: CPT

## 2018-05-03 PROCEDURE — 96374 THER/PROPH/DIAG INJ IV PUSH: CPT

## 2018-05-03 PROCEDURE — 2060000000 HC ICU INTERMEDIATE R&B

## 2018-05-03 PROCEDURE — 84484 ASSAY OF TROPONIN QUANT: CPT

## 2018-05-03 PROCEDURE — 83690 ASSAY OF LIPASE: CPT

## 2018-05-03 PROCEDURE — 85025 COMPLETE CBC W/AUTO DIFF WBC: CPT

## 2018-05-03 PROCEDURE — 80053 COMPREHEN METABOLIC PANEL: CPT

## 2018-05-03 PROCEDURE — 6360000002 HC RX W HCPCS: Performed by: STUDENT IN AN ORGANIZED HEALTH CARE EDUCATION/TRAINING PROGRAM

## 2018-05-03 PROCEDURE — 36415 COLL VENOUS BLD VENIPUNCTURE: CPT

## 2018-05-03 PROCEDURE — 96372 THER/PROPH/DIAG INJ SC/IM: CPT

## 2018-05-03 PROCEDURE — 86147 CARDIOLIPIN ANTIBODY EA IG: CPT

## 2018-05-03 RX ORDER — ONDANSETRON 4 MG/1
4 TABLET, FILM COATED ORAL ONCE
Status: COMPLETED | OUTPATIENT
Start: 2018-05-03 | End: 2018-05-03

## 2018-05-03 RX ORDER — KETOROLAC TROMETHAMINE 15 MG/ML
15 INJECTION, SOLUTION INTRAMUSCULAR; INTRAVENOUS ONCE
Status: COMPLETED | OUTPATIENT
Start: 2018-05-03 | End: 2018-05-03

## 2018-05-03 RX ORDER — SODIUM CHLORIDE 0.9 % (FLUSH) 0.9 %
10 SYRINGE (ML) INJECTION EVERY 12 HOURS SCHEDULED
Status: DISCONTINUED | OUTPATIENT
Start: 2018-05-03 | End: 2018-05-05 | Stop reason: HOSPADM

## 2018-05-03 RX ORDER — 0.9 % SODIUM CHLORIDE 0.9 %
1000 INTRAVENOUS SOLUTION INTRAVENOUS ONCE
Status: COMPLETED | OUTPATIENT
Start: 2018-05-03 | End: 2018-05-03

## 2018-05-03 RX ORDER — ACETAMINOPHEN 325 MG/1
650 TABLET ORAL EVERY 4 HOURS PRN
Status: DISCONTINUED | OUTPATIENT
Start: 2018-05-03 | End: 2018-05-05 | Stop reason: HOSPADM

## 2018-05-03 RX ORDER — ACETAMINOPHEN 500 MG
500 TABLET ORAL EVERY 6 HOURS PRN
Status: ON HOLD | COMMUNITY
End: 2018-05-05 | Stop reason: HOSPADM

## 2018-05-03 RX ORDER — SODIUM CHLORIDE 0.9 % (FLUSH) 0.9 %
10 SYRINGE (ML) INJECTION PRN
Status: DISCONTINUED | OUTPATIENT
Start: 2018-05-03 | End: 2018-05-05 | Stop reason: HOSPADM

## 2018-05-03 RX ORDER — ONDANSETRON 2 MG/ML
4 INJECTION INTRAMUSCULAR; INTRAVENOUS EVERY 6 HOURS PRN
Status: DISCONTINUED | OUTPATIENT
Start: 2018-05-03 | End: 2018-05-05 | Stop reason: HOSPADM

## 2018-05-03 RX ADMIN — IOPAMIDOL 75 ML: 755 INJECTION, SOLUTION INTRAVENOUS at 20:09

## 2018-05-03 RX ADMIN — ONDANSETRON HYDROCHLORIDE 4 MG: 4 TABLET, FILM COATED ORAL at 19:29

## 2018-05-03 RX ADMIN — Medication 10 ML: at 22:36

## 2018-05-03 RX ADMIN — KETOROLAC TROMETHAMINE 15 MG: 15 INJECTION, SOLUTION INTRAMUSCULAR; INTRAVENOUS at 19:31

## 2018-05-03 RX ADMIN — SODIUM CHLORIDE 1000 ML: 9 INJECTION, SOLUTION INTRAVENOUS at 19:31

## 2018-05-03 RX ADMIN — ENOXAPARIN SODIUM 80 MG: 80 INJECTION SUBCUTANEOUS at 21:08

## 2018-05-03 ASSESSMENT — ENCOUNTER SYMPTOMS
BACK PAIN: 0
SHORTNESS OF BREATH: 0
SORE THROAT: 0
ABDOMINAL DISTENTION: 0
BLOOD IN STOOL: 0
NAUSEA: 1
PHOTOPHOBIA: 0
WHEEZING: 0
RHINORRHEA: 0
COUGH: 0
ABDOMINAL PAIN: 1

## 2018-05-03 ASSESSMENT — PAIN DESCRIPTION - DESCRIPTORS
DESCRIPTORS: ACHING
DESCRIPTORS: CRAMPING

## 2018-05-03 ASSESSMENT — PAIN DESCRIPTION - PROGRESSION
CLINICAL_PROGRESSION: NOT CHANGED
CLINICAL_PROGRESSION: NOT CHANGED

## 2018-05-03 ASSESSMENT — PAIN DESCRIPTION - FREQUENCY
FREQUENCY: CONTINUOUS
FREQUENCY: CONTINUOUS

## 2018-05-03 ASSESSMENT — PAIN DESCRIPTION - ONSET
ONSET: SUDDEN
ONSET: SUDDEN

## 2018-05-03 ASSESSMENT — PAIN DESCRIPTION - LOCATION
LOCATION: ABDOMEN
LOCATION: ABDOMEN;BACK

## 2018-05-03 ASSESSMENT — PAIN SCALES - GENERAL
PAINLEVEL_OUTOF10: 6
PAINLEVEL_OUTOF10: 9

## 2018-05-03 ASSESSMENT — PAIN DESCRIPTION - ORIENTATION
ORIENTATION: RIGHT
ORIENTATION: RIGHT

## 2018-05-03 ASSESSMENT — PAIN DESCRIPTION - PAIN TYPE
TYPE: ACUTE PAIN
TYPE: ACUTE PAIN

## 2018-05-04 ENCOUNTER — APPOINTMENT (OUTPATIENT)
Dept: CT IMAGING | Age: 28
DRG: 134 | End: 2018-05-04
Payer: MEDICAID

## 2018-05-04 ENCOUNTER — APPOINTMENT (OUTPATIENT)
Dept: ULTRASOUND IMAGING | Age: 28
DRG: 134 | End: 2018-05-04
Payer: MEDICAID

## 2018-05-04 LAB
ABSOLUTE EOS #: 0.17 K/UL (ref 0–0.44)
ABSOLUTE IMMATURE GRANULOCYTE: <0.03 K/UL (ref 0–0.3)
ABSOLUTE LYMPH #: 3.23 K/UL (ref 1.1–3.7)
ABSOLUTE MONO #: 0.6 K/UL (ref 0.1–1.2)
ALBUMIN SERPL-MCNC: 3.4 G/DL (ref 3.5–5.2)
ALBUMIN/GLOBULIN RATIO: 1.1 (ref 1–2.5)
ALP BLD-CCNC: 108 U/L (ref 35–104)
ALT SERPL-CCNC: 44 U/L (ref 5–33)
ANION GAP SERPL CALCULATED.3IONS-SCNC: 11 MMOL/L (ref 9–17)
AST SERPL-CCNC: 43 U/L
BASOPHILS # BLD: 0 % (ref 0–2)
BASOPHILS ABSOLUTE: 0.03 K/UL (ref 0–0.2)
BILIRUB SERPL-MCNC: 0.33 MG/DL (ref 0.3–1.2)
BILIRUBIN DIRECT: <0.08 MG/DL
BILIRUBIN, INDIRECT: ABNORMAL MG/DL (ref 0–1)
BNP INTERPRETATION: NORMAL
BUN BLDV-MCNC: 10 MG/DL (ref 6–20)
BUN/CREAT BLD: ABNORMAL (ref 9–20)
C-REACTIVE PROTEIN: 36.4 MG/L (ref 0–5)
CALCIUM SERPL-MCNC: 8.4 MG/DL (ref 8.6–10.4)
CHLORIDE BLD-SCNC: 111 MMOL/L (ref 98–107)
CO2: 18 MMOL/L (ref 20–31)
CREAT SERPL-MCNC: 0.61 MG/DL (ref 0.5–0.9)
DIFFERENTIAL TYPE: ABNORMAL
EOSINOPHILS RELATIVE PERCENT: 2 % (ref 1–4)
GFR AFRICAN AMERICAN: >60 ML/MIN
GFR NON-AFRICAN AMERICAN: >60 ML/MIN
GFR SERPL CREATININE-BSD FRML MDRD: ABNORMAL ML/MIN/{1.73_M2}
GFR SERPL CREATININE-BSD FRML MDRD: ABNORMAL ML/MIN/{1.73_M2}
GLOBULIN: ABNORMAL G/DL (ref 1.5–3.8)
GLUCOSE BLD-MCNC: 72 MG/DL (ref 70–99)
HAV IGM SER IA-ACNC: NONREACTIVE
HCT VFR BLD CALC: 37.5 % (ref 36.3–47.1)
HEMOGLOBIN: 11.4 G/DL (ref 11.9–15.1)
HEPATITIS B CORE IGM ANTIBODY: NONREACTIVE
HEPATITIS B SURFACE ANTIGEN: NONREACTIVE
HEPATITIS C ANTIBODY: NONREACTIVE
IMMATURE GRANULOCYTES: 0 %
LV EF: 55 %
LVEF MODALITY: NORMAL
LYMPHOCYTES # BLD: 43 % (ref 24–43)
MCH RBC QN AUTO: 26.8 PG (ref 25.2–33.5)
MCHC RBC AUTO-ENTMCNC: 30.4 G/DL (ref 28.4–34.8)
MCV RBC AUTO: 88.2 FL (ref 82.6–102.9)
MONOCYTES # BLD: 8 % (ref 3–12)
NRBC AUTOMATED: 0 PER 100 WBC
PDW BLD-RTO: 16.6 % (ref 11.8–14.4)
PLATELET # BLD: 227 K/UL (ref 138–453)
PLATELET ESTIMATE: ABNORMAL
PMV BLD AUTO: 10.9 FL (ref 8.1–13.5)
POTASSIUM SERPL-SCNC: 4.6 MMOL/L (ref 3.7–5.3)
PRO-BNP: <20 PG/ML
PROCALCITONIN: <0.02 NG/ML
RBC # BLD: 4.25 M/UL (ref 3.95–5.11)
RBC # BLD: ABNORMAL 10*6/UL
SEG NEUTROPHILS: 46 % (ref 36–65)
SEGMENTED NEUTROPHILS ABSOLUTE COUNT: 3.46 K/UL (ref 1.5–8.1)
SODIUM BLD-SCNC: 140 MMOL/L (ref 135–144)
TOTAL PROTEIN: 6.6 G/DL (ref 6.4–8.3)
TROPONIN INTERP: NORMAL
TROPONIN T: <0.03 NG/ML
WBC # BLD: 7.5 K/UL (ref 3.5–11.3)
WBC # BLD: ABNORMAL 10*3/UL

## 2018-05-04 PROCEDURE — 2060000000 HC ICU INTERMEDIATE R&B

## 2018-05-04 PROCEDURE — 71260 CT THORAX DX C+: CPT

## 2018-05-04 PROCEDURE — 6360000002 HC RX W HCPCS: Performed by: STUDENT IN AN ORGANIZED HEALTH CARE EDUCATION/TRAINING PROGRAM

## 2018-05-04 PROCEDURE — 94762 N-INVAS EAR/PLS OXIMTRY CONT: CPT

## 2018-05-04 PROCEDURE — 6370000000 HC RX 637 (ALT 250 FOR IP): Performed by: INTERNAL MEDICINE

## 2018-05-04 PROCEDURE — 80048 BASIC METABOLIC PNL TOTAL CA: CPT

## 2018-05-04 PROCEDURE — 93306 TTE W/DOPPLER COMPLETE: CPT

## 2018-05-04 PROCEDURE — 2580000003 HC RX 258: Performed by: HOSPITALIST

## 2018-05-04 PROCEDURE — 84484 ASSAY OF TROPONIN QUANT: CPT

## 2018-05-04 PROCEDURE — 99223 1ST HOSP IP/OBS HIGH 75: CPT | Performed by: INTERNAL MEDICINE

## 2018-05-04 PROCEDURE — 80076 HEPATIC FUNCTION PANEL: CPT

## 2018-05-04 PROCEDURE — 36415 COLL VENOUS BLD VENIPUNCTURE: CPT

## 2018-05-04 PROCEDURE — 6360000004 HC RX CONTRAST MEDICATION: Performed by: INTERNAL MEDICINE

## 2018-05-04 PROCEDURE — 85025 COMPLETE CBC W/AUTO DIFF WBC: CPT

## 2018-05-04 PROCEDURE — 93970 EXTREMITY STUDY: CPT

## 2018-05-04 PROCEDURE — 2580000003 HC RX 258: Performed by: STUDENT IN AN ORGANIZED HEALTH CARE EDUCATION/TRAINING PROGRAM

## 2018-05-04 PROCEDURE — 76705 ECHO EXAM OF ABDOMEN: CPT

## 2018-05-04 RX ORDER — MORPHINE SULFATE 4 MG/ML
2 INJECTION, SOLUTION INTRAMUSCULAR; INTRAVENOUS EVERY 4 HOURS PRN
Status: DISCONTINUED | OUTPATIENT
Start: 2018-05-04 | End: 2018-05-05 | Stop reason: HOSPADM

## 2018-05-04 RX ORDER — SODIUM CHLORIDE 9 MG/ML
INJECTION, SOLUTION INTRAVENOUS CONTINUOUS
Status: DISCONTINUED | OUTPATIENT
Start: 2018-05-04 | End: 2018-05-05 | Stop reason: HOSPADM

## 2018-05-04 RX ORDER — MORPHINE SULFATE 4 MG/ML
1 INJECTION, SOLUTION INTRAMUSCULAR; INTRAVENOUS EVERY 4 HOURS PRN
Status: DISCONTINUED | OUTPATIENT
Start: 2018-05-04 | End: 2018-05-05 | Stop reason: HOSPADM

## 2018-05-04 RX ORDER — POTASSIUM CHLORIDE 7.45 MG/ML
10 INJECTION INTRAVENOUS PRN
Status: DISCONTINUED | OUTPATIENT
Start: 2018-05-04 | End: 2018-05-05 | Stop reason: HOSPADM

## 2018-05-04 RX ORDER — POTASSIUM CHLORIDE 20 MEQ/1
40 TABLET, EXTENDED RELEASE ORAL PRN
Status: DISCONTINUED | OUTPATIENT
Start: 2018-05-04 | End: 2018-05-05 | Stop reason: HOSPADM

## 2018-05-04 RX ORDER — POTASSIUM CHLORIDE 20MEQ/15ML
40 LIQUID (ML) ORAL PRN
Status: DISCONTINUED | OUTPATIENT
Start: 2018-05-04 | End: 2018-05-05 | Stop reason: HOSPADM

## 2018-05-04 RX ADMIN — ENOXAPARIN SODIUM 80 MG: 80 INJECTION SUBCUTANEOUS at 21:12

## 2018-05-04 RX ADMIN — SODIUM CHLORIDE: 9 INJECTION, SOLUTION INTRAVENOUS at 12:31

## 2018-05-04 RX ADMIN — Medication 10 ML: at 08:25

## 2018-05-04 RX ADMIN — IOPAMIDOL 75 ML: 755 INJECTION, SOLUTION INTRAVENOUS at 09:41

## 2018-05-04 RX ADMIN — ACETAMINOPHEN 650 MG: 325 TABLET ORAL at 12:49

## 2018-05-04 RX ADMIN — ENOXAPARIN SODIUM 80 MG: 80 INJECTION SUBCUTANEOUS at 08:24

## 2018-05-04 ASSESSMENT — PAIN DESCRIPTION - FREQUENCY: FREQUENCY: CONTINUOUS

## 2018-05-04 ASSESSMENT — PAIN DESCRIPTION - PROGRESSION
CLINICAL_PROGRESSION: NOT CHANGED

## 2018-05-04 ASSESSMENT — PAIN DESCRIPTION - LOCATION: LOCATION: ABDOMEN;BACK

## 2018-05-04 ASSESSMENT — PAIN SCALES - GENERAL
PAINLEVEL_OUTOF10: 5
PAINLEVEL_OUTOF10: 7

## 2018-05-04 ASSESSMENT — PAIN DESCRIPTION - PAIN TYPE: TYPE: ACUTE PAIN

## 2018-05-04 ASSESSMENT — PAIN DESCRIPTION - DESCRIPTORS: DESCRIPTORS: ACHING

## 2018-05-04 ASSESSMENT — PAIN DESCRIPTION - ORIENTATION: ORIENTATION: RIGHT

## 2018-05-05 VITALS
DIASTOLIC BLOOD PRESSURE: 75 MMHG | BODY MASS INDEX: 30.96 KG/M2 | WEIGHT: 185.85 LBS | RESPIRATION RATE: 17 BRPM | TEMPERATURE: 98.4 F | HEART RATE: 95 BPM | HEIGHT: 65 IN | SYSTOLIC BLOOD PRESSURE: 130 MMHG | OXYGEN SATURATION: 100 %

## 2018-05-05 PROBLEM — R82.71 BACTERIURIA: Status: ACTIVE | Noted: 2018-05-05

## 2018-05-05 LAB
ANION GAP SERPL CALCULATED.3IONS-SCNC: 11 MMOL/L (ref 9–17)
ANTI B2-GLYCOPROTEIN IGG: 0 SGU (ref 0–20)
ANTI B2-GLYCOPROTEIN IGM: 2 SMU (ref 0–20)
BUN BLDV-MCNC: 11 MG/DL (ref 6–20)
BUN/CREAT BLD: ABNORMAL (ref 9–20)
CALCIUM SERPL-MCNC: 8.5 MG/DL (ref 8.6–10.4)
CHLORIDE BLD-SCNC: 111 MMOL/L (ref 98–107)
CO2: 17 MMOL/L (ref 20–31)
CREAT SERPL-MCNC: 0.63 MG/DL (ref 0.5–0.9)
GFR AFRICAN AMERICAN: >60 ML/MIN
GFR NON-AFRICAN AMERICAN: >60 ML/MIN
GFR SERPL CREATININE-BSD FRML MDRD: ABNORMAL ML/MIN/{1.73_M2}
GFR SERPL CREATININE-BSD FRML MDRD: ABNORMAL ML/MIN/{1.73_M2}
GLUCOSE BLD-MCNC: 81 MG/DL (ref 70–99)
POTASSIUM SERPL-SCNC: 4.2 MMOL/L (ref 3.7–5.3)
SODIUM BLD-SCNC: 139 MMOL/L (ref 135–144)
TROPONIN INTERP: NORMAL
TROPONIN T: <0.03 NG/ML

## 2018-05-05 PROCEDURE — 6360000002 HC RX W HCPCS: Performed by: STUDENT IN AN ORGANIZED HEALTH CARE EDUCATION/TRAINING PROGRAM

## 2018-05-05 PROCEDURE — 36415 COLL VENOUS BLD VENIPUNCTURE: CPT

## 2018-05-05 PROCEDURE — 6370000000 HC RX 637 (ALT 250 FOR IP): Performed by: INTERNAL MEDICINE

## 2018-05-05 PROCEDURE — S9443 LACTATION CLASS: HCPCS

## 2018-05-05 PROCEDURE — 80048 BASIC METABOLIC PNL TOTAL CA: CPT

## 2018-05-05 PROCEDURE — 99232 SBSQ HOSP IP/OBS MODERATE 35: CPT | Performed by: INTERNAL MEDICINE

## 2018-05-05 PROCEDURE — 94762 N-INVAS EAR/PLS OXIMTRY CONT: CPT

## 2018-05-05 PROCEDURE — 84484 ASSAY OF TROPONIN QUANT: CPT

## 2018-05-05 RX ADMIN — ACETAMINOPHEN 650 MG: 325 TABLET ORAL at 01:20

## 2018-05-05 RX ADMIN — ENOXAPARIN SODIUM 80 MG: 80 INJECTION SUBCUTANEOUS at 09:42

## 2018-05-05 RX ADMIN — ENOXAPARIN SODIUM 80 MG: 80 INJECTION SUBCUTANEOUS at 18:02

## 2018-05-05 ASSESSMENT — PAIN DESCRIPTION - PROGRESSION

## 2018-05-05 ASSESSMENT — PAIN SCALES - GENERAL: PAINLEVEL_OUTOF10: 7

## 2018-05-07 LAB — ANTICARDIOLIPIN IGA ANTIBODY: 0.5 APU

## 2018-05-09 LAB
CULTURE: NORMAL
Lab: NORMAL
Lab: NORMAL
SPECIMEN DESCRIPTION: NORMAL
SPECIMEN DESCRIPTION: NORMAL
STATUS: NORMAL
STATUS: NORMAL

## 2019-03-06 ENCOUNTER — OFFICE VISIT (OUTPATIENT)
Dept: OBGYN CLINIC | Age: 29
End: 2019-03-06
Payer: MEDICAID

## 2019-03-06 ENCOUNTER — HOSPITAL ENCOUNTER (OUTPATIENT)
Age: 29
Setting detail: SPECIMEN
Discharge: HOME | End: 2019-03-06
Payer: MEDICAID

## 2019-03-06 VITALS
SYSTOLIC BLOOD PRESSURE: 132 MMHG | HEIGHT: 65 IN | BODY MASS INDEX: 27.66 KG/M2 | WEIGHT: 166 LBS | DIASTOLIC BLOOD PRESSURE: 84 MMHG

## 2019-03-06 DIAGNOSIS — N89.8 VAGINAL IRRITATION: ICD-10-CM

## 2019-03-06 DIAGNOSIS — N89.8 VAGINAL IRRITATION: Primary | ICD-10-CM

## 2019-03-06 DIAGNOSIS — N92.6 IRREGULAR MENSES: ICD-10-CM

## 2019-03-06 DIAGNOSIS — R30.0 DYSURIA: ICD-10-CM

## 2019-03-06 LAB
BILIRUBIN, POC: NORMAL
BLOOD URINE, POC: NORMAL
CLARITY, POC: CLEAR
COLOR, POC: YELLOW
CONTROL: PRESENT
DIRECT EXAM: ABNORMAL
GLUCOSE URINE, POC: NORMAL
KETONES, POC: NORMAL
LEUKOCYTE EST, POC: NORMAL
Lab: ABNORMAL
NITRITE, POC: NORMAL
PH, POC: 7.5
PREGNANCY TEST URINE, POC: NEGATIVE
PROTEIN, POC: NORMAL
SPECIFIC GRAVITY, POC: 1.02
SPECIMEN DESCRIPTION: ABNORMAL
UROBILINOGEN, POC: NORMAL

## 2019-03-06 PROCEDURE — G8484 FLU IMMUNIZE NO ADMIN: HCPCS | Performed by: ADVANCED PRACTICE MIDWIFE

## 2019-03-06 PROCEDURE — 1036F TOBACCO NON-USER: CPT | Performed by: ADVANCED PRACTICE MIDWIFE

## 2019-03-06 PROCEDURE — 81003 URINALYSIS AUTO W/O SCOPE: CPT | Performed by: ADVANCED PRACTICE MIDWIFE

## 2019-03-06 PROCEDURE — 81025 URINE PREGNANCY TEST: CPT | Performed by: ADVANCED PRACTICE MIDWIFE

## 2019-03-06 PROCEDURE — G8419 CALC BMI OUT NRM PARAM NOF/U: HCPCS | Performed by: ADVANCED PRACTICE MIDWIFE

## 2019-03-06 PROCEDURE — 99213 OFFICE O/P EST LOW 20 MIN: CPT | Performed by: ADVANCED PRACTICE MIDWIFE

## 2019-03-06 PROCEDURE — G8427 DOCREV CUR MEDS BY ELIG CLIN: HCPCS | Performed by: ADVANCED PRACTICE MIDWIFE

## 2019-03-06 ASSESSMENT — ENCOUNTER SYMPTOMS
EYES NEGATIVE: 1
RESPIRATORY NEGATIVE: 1
ALLERGIC/IMMUNOLOGIC NEGATIVE: 1
GASTROINTESTINAL NEGATIVE: 1

## 2019-03-08 RX ORDER — METRONIDAZOLE 7.5 MG/G
GEL VAGINAL
Qty: 1 TUBE | Refills: 0 | Status: SHIPPED | OUTPATIENT
Start: 2019-03-08 | End: 2020-02-03 | Stop reason: ALTCHOICE

## 2019-10-23 ENCOUNTER — OFFICE VISIT (OUTPATIENT)
Dept: OBGYN CLINIC | Age: 29
End: 2019-10-23
Payer: MEDICAID

## 2019-10-23 ENCOUNTER — HOSPITAL ENCOUNTER (OUTPATIENT)
Dept: ULTRASOUND IMAGING | Facility: CLINIC | Age: 29
Discharge: HOME | End: 2019-10-25
Payer: MEDICAID

## 2019-10-23 VITALS
WEIGHT: 172 LBS | SYSTOLIC BLOOD PRESSURE: 120 MMHG | HEART RATE: 72 BPM | DIASTOLIC BLOOD PRESSURE: 70 MMHG | BODY MASS INDEX: 28.62 KG/M2

## 2019-10-23 DIAGNOSIS — N91.2 AMENORRHEA: ICD-10-CM

## 2019-10-23 DIAGNOSIS — N91.2 AMENORRHEA: Primary | ICD-10-CM

## 2019-10-23 LAB
CONTROL: PRESENT
PREGNANCY TEST URINE, POC: POSITIVE

## 2019-10-23 PROCEDURE — G8427 DOCREV CUR MEDS BY ELIG CLIN: HCPCS | Performed by: ADVANCED PRACTICE MIDWIFE

## 2019-10-23 PROCEDURE — 81025 URINE PREGNANCY TEST: CPT | Performed by: ADVANCED PRACTICE MIDWIFE

## 2019-10-23 PROCEDURE — 76817 TRANSVAGINAL US OBSTETRIC: CPT

## 2019-10-23 PROCEDURE — G8484 FLU IMMUNIZE NO ADMIN: HCPCS | Performed by: ADVANCED PRACTICE MIDWIFE

## 2019-10-23 PROCEDURE — 99213 OFFICE O/P EST LOW 20 MIN: CPT | Performed by: ADVANCED PRACTICE MIDWIFE

## 2019-10-23 PROCEDURE — 1036F TOBACCO NON-USER: CPT | Performed by: ADVANCED PRACTICE MIDWIFE

## 2019-10-23 PROCEDURE — G8419 CALC BMI OUT NRM PARAM NOF/U: HCPCS | Performed by: ADVANCED PRACTICE MIDWIFE

## 2019-10-23 PROCEDURE — 76801 OB US < 14 WKS SINGLE FETUS: CPT

## 2019-11-20 ENCOUNTER — HOSPITAL ENCOUNTER (EMERGENCY)
Age: 29
Discharge: HOME | End: 2019-11-21
Attending: EMERGENCY MEDICINE
Payer: MEDICAID

## 2019-11-20 VITALS
OXYGEN SATURATION: 100 % | HEART RATE: 80 BPM | DIASTOLIC BLOOD PRESSURE: 70 MMHG | TEMPERATURE: 98.4 F | SYSTOLIC BLOOD PRESSURE: 110 MMHG | WEIGHT: 176 LBS | BODY MASS INDEX: 29.32 KG/M2 | HEIGHT: 65 IN | RESPIRATION RATE: 16 BRPM

## 2019-11-20 DIAGNOSIS — R51.9 NONINTRACTABLE HEADACHE, UNSPECIFIED CHRONICITY PATTERN, UNSPECIFIED HEADACHE TYPE: Primary | ICD-10-CM

## 2019-11-20 PROCEDURE — 99284 EMERGENCY DEPT VISIT MOD MDM: CPT

## 2019-11-20 PROCEDURE — 96375 TX/PRO/DX INJ NEW DRUG ADDON: CPT

## 2019-11-20 PROCEDURE — 6360000002 HC RX W HCPCS: Performed by: EMERGENCY MEDICINE

## 2019-11-20 PROCEDURE — 2580000003 HC RX 258: Performed by: EMERGENCY MEDICINE

## 2019-11-20 PROCEDURE — 96374 THER/PROPH/DIAG INJ IV PUSH: CPT

## 2019-11-20 RX ORDER — ONDANSETRON 2 MG/ML
4 INJECTION INTRAMUSCULAR; INTRAVENOUS ONCE
Status: COMPLETED | OUTPATIENT
Start: 2019-11-20 | End: 2019-11-20

## 2019-11-20 RX ORDER — DIPHENHYDRAMINE HYDROCHLORIDE 50 MG/ML
25 INJECTION INTRAMUSCULAR; INTRAVENOUS ONCE
Status: COMPLETED | OUTPATIENT
Start: 2019-11-20 | End: 2019-11-20

## 2019-11-20 RX ORDER — METOCLOPRAMIDE HYDROCHLORIDE 5 MG/ML
10 INJECTION INTRAMUSCULAR; INTRAVENOUS ONCE
Status: COMPLETED | OUTPATIENT
Start: 2019-11-20 | End: 2019-11-20

## 2019-11-20 RX ORDER — 0.9 % SODIUM CHLORIDE 0.9 %
500 INTRAVENOUS SOLUTION INTRAVENOUS ONCE
Status: COMPLETED | OUTPATIENT
Start: 2019-11-20 | End: 2019-11-21

## 2019-11-20 RX ADMIN — DIPHENHYDRAMINE HYDROCHLORIDE 25 MG: 50 INJECTION INTRAMUSCULAR; INTRAVENOUS at 22:55

## 2019-11-20 RX ADMIN — METOCLOPRAMIDE 10 MG: 5 INJECTION, SOLUTION INTRAMUSCULAR; INTRAVENOUS at 22:55

## 2019-11-20 RX ADMIN — ONDANSETRON 4 MG: 2 INJECTION INTRAMUSCULAR; INTRAVENOUS at 22:55

## 2019-11-20 RX ADMIN — SODIUM CHLORIDE 500 ML: 0.9 INJECTION, SOLUTION INTRAVENOUS at 22:54

## 2019-11-20 ASSESSMENT — ENCOUNTER SYMPTOMS
SORE THROAT: 0
NAUSEA: 1
BACK PAIN: 0
DIARRHEA: 0
VOMITING: 0
COUGH: 0
PHOTOPHOBIA: 1
SHORTNESS OF BREATH: 0
ABDOMINAL PAIN: 0

## 2019-11-21 ENCOUNTER — APPOINTMENT (OUTPATIENT)
Dept: MRI IMAGING | Age: 29
End: 2019-11-21
Payer: MEDICAID

## 2019-11-21 PROCEDURE — 70544 MR ANGIOGRAPHY HEAD W/O DYE: CPT

## 2019-11-21 PROCEDURE — 70551 MRI BRAIN STEM W/O DYE: CPT

## 2019-11-22 ENCOUNTER — INITIAL PRENATAL (OUTPATIENT)
Dept: OBGYN CLINIC | Age: 29
End: 2019-11-22

## 2019-11-22 VITALS
BODY MASS INDEX: 29.82 KG/M2 | WEIGHT: 179 LBS | HEIGHT: 65 IN | SYSTOLIC BLOOD PRESSURE: 101 MMHG | HEART RATE: 60 BPM | DIASTOLIC BLOOD PRESSURE: 64 MMHG

## 2019-11-22 DIAGNOSIS — Z13.79 GENETIC TESTING: ICD-10-CM

## 2019-11-22 DIAGNOSIS — O09.299 HISTORY OF PRE-ECLAMPSIA IN PRIOR PREGNANCY, CURRENTLY PREGNANT: ICD-10-CM

## 2019-11-22 DIAGNOSIS — I26.99 PULMONARY EMBOLISM WITH INFARCTION (HCC): ICD-10-CM

## 2019-11-22 DIAGNOSIS — Z86.711 HX PULMONARY EMBOLISM: ICD-10-CM

## 2019-11-22 DIAGNOSIS — Z34.90 PREGNANCY, UNSPECIFIED GESTATIONAL AGE: Primary | ICD-10-CM

## 2019-11-25 DIAGNOSIS — I26.99 PULMONARY EMBOLISM WITH INFARCTION (HCC): ICD-10-CM

## 2019-11-25 DIAGNOSIS — Z34.90 PREGNANCY, UNSPECIFIED GESTATIONAL AGE: ICD-10-CM

## 2019-11-25 DIAGNOSIS — Z13.79 GENETIC TESTING: ICD-10-CM

## 2019-11-25 DIAGNOSIS — O09.299 HISTORY OF PRE-ECLAMPSIA IN PRIOR PREGNANCY, CURRENTLY PREGNANT: ICD-10-CM

## 2019-11-25 DIAGNOSIS — Z86.711 HX PULMONARY EMBOLISM: Primary | ICD-10-CM

## 2019-12-16 ENCOUNTER — ROUTINE PRENATAL (OUTPATIENT)
Dept: PERINATAL CARE | Age: 29
End: 2019-12-16
Payer: MEDICAID

## 2019-12-16 VITALS
DIASTOLIC BLOOD PRESSURE: 57 MMHG | WEIGHT: 181 LBS | BODY MASS INDEX: 30.16 KG/M2 | HEART RATE: 84 BPM | TEMPERATURE: 98.4 F | RESPIRATION RATE: 16 BRPM | HEIGHT: 65 IN | SYSTOLIC BLOOD PRESSURE: 102 MMHG

## 2019-12-16 DIAGNOSIS — O09.291 HX OF PREECLAMPSIA, PRIOR PREGNANCY, CURRENTLY PREGNANT, FIRST TRIMESTER: ICD-10-CM

## 2019-12-16 DIAGNOSIS — Z87.59 H/O PULMONARY EMBOLUS DURING PREGNANCY: Primary | ICD-10-CM

## 2019-12-16 DIAGNOSIS — Z36.9 FIRST TRIMESTER SCREENING: ICD-10-CM

## 2019-12-16 DIAGNOSIS — Z86.711 H/O PULMONARY EMBOLUS DURING PREGNANCY: Primary | ICD-10-CM

## 2019-12-16 DIAGNOSIS — Z3A.13 13 WEEKS GESTATION OF PREGNANCY: ICD-10-CM

## 2019-12-16 DIAGNOSIS — O09.899 SHORT INTERVAL BETWEEN PREGNANCIES COMPLICATING PREGNANCY, ANTEPARTUM: ICD-10-CM

## 2019-12-16 DIAGNOSIS — O35.2XX0 HEREDITARY FAMILIAL DISEASE AFFECTING MANAGEMENT OF MOTHER AND POSSIBLY AFFECTING FETUS, ANTEPARTUM, SINGLE OR UNSPECIFIED FETUS: ICD-10-CM

## 2019-12-16 DIAGNOSIS — O36.80X0 ENCOUNTER TO DETERMINE FETAL VIABILITY OF PREGNANCY, SINGLE OR UNSPECIFIED FETUS: ICD-10-CM

## 2019-12-16 PROBLEM — O09.90 HIGH RISK PREGNANCY, ANTEPARTUM: Status: ACTIVE | Noted: 2019-12-16

## 2019-12-16 PROCEDURE — G8484 FLU IMMUNIZE NO ADMIN: HCPCS | Performed by: OBSTETRICS & GYNECOLOGY

## 2019-12-16 PROCEDURE — G8427 DOCREV CUR MEDS BY ELIG CLIN: HCPCS | Performed by: OBSTETRICS & GYNECOLOGY

## 2019-12-16 PROCEDURE — 76801 OB US < 14 WKS SINGLE FETUS: CPT | Performed by: OBSTETRICS & GYNECOLOGY

## 2019-12-16 PROCEDURE — 99243 OFF/OP CNSLTJ NEW/EST LOW 30: CPT | Performed by: OBSTETRICS & GYNECOLOGY

## 2019-12-16 PROCEDURE — 76813 OB US NUCHAL MEAS 1 GEST: CPT | Performed by: OBSTETRICS & GYNECOLOGY

## 2019-12-16 PROCEDURE — G8419 CALC BMI OUT NRM PARAM NOF/U: HCPCS | Performed by: OBSTETRICS & GYNECOLOGY

## 2019-12-17 PROBLEM — Z82.79 FAMILY HISTORY OF NEURAL TUBE DEFECT: Status: ACTIVE | Noted: 2019-12-17

## 2019-12-20 ENCOUNTER — ROUTINE PRENATAL (OUTPATIENT)
Dept: OBGYN CLINIC | Age: 29
End: 2019-12-20
Payer: MEDICAID

## 2019-12-20 VITALS
HEART RATE: 78 BPM | BODY MASS INDEX: 29.79 KG/M2 | WEIGHT: 179 LBS | SYSTOLIC BLOOD PRESSURE: 116 MMHG | DIASTOLIC BLOOD PRESSURE: 60 MMHG

## 2019-12-20 DIAGNOSIS — Z82.79 FAMILY HISTORY OF NEURAL TUBE DEFECT: ICD-10-CM

## 2019-12-20 DIAGNOSIS — I26.99 PULMONARY EMBOLISM WITH INFARCTION (HCC): Primary | ICD-10-CM

## 2019-12-20 PROCEDURE — G8419 CALC BMI OUT NRM PARAM NOF/U: HCPCS | Performed by: ADVANCED PRACTICE MIDWIFE

## 2019-12-20 PROCEDURE — G8484 FLU IMMUNIZE NO ADMIN: HCPCS | Performed by: ADVANCED PRACTICE MIDWIFE

## 2019-12-20 PROCEDURE — G8427 DOCREV CUR MEDS BY ELIG CLIN: HCPCS | Performed by: ADVANCED PRACTICE MIDWIFE

## 2019-12-20 PROCEDURE — 99213 OFFICE O/P EST LOW 20 MIN: CPT | Performed by: ADVANCED PRACTICE MIDWIFE

## 2019-12-20 PROCEDURE — 1036F TOBACCO NON-USER: CPT | Performed by: ADVANCED PRACTICE MIDWIFE

## 2020-01-10 ENCOUNTER — TELEPHONE (OUTPATIENT)
Dept: OBGYN CLINIC | Age: 30
End: 2020-01-10

## 2020-01-17 ENCOUNTER — ROUTINE PRENATAL (OUTPATIENT)
Dept: OBGYN CLINIC | Age: 30
End: 2020-01-17
Payer: MEDICAID

## 2020-01-17 VITALS
DIASTOLIC BLOOD PRESSURE: 71 MMHG | SYSTOLIC BLOOD PRESSURE: 116 MMHG | HEART RATE: 106 BPM | WEIGHT: 186.6 LBS | BODY MASS INDEX: 31.05 KG/M2

## 2020-01-17 PROBLEM — Z23 FLU VACCINE NEED: Status: ACTIVE | Noted: 2020-01-17

## 2020-01-17 PROBLEM — Z86.59 HISTORY OF POSTPARTUM DEPRESSION: Status: ACTIVE | Noted: 2020-01-17

## 2020-01-17 PROBLEM — Z87.59 HISTORY OF POSTPARTUM DEPRESSION: Status: ACTIVE | Noted: 2020-01-17

## 2020-01-17 PROCEDURE — 99212 OFFICE O/P EST SF 10 MIN: CPT | Performed by: OBSTETRICS & GYNECOLOGY

## 2020-01-17 PROCEDURE — 90471 IMMUNIZATION ADMIN: CPT | Performed by: OBSTETRICS & GYNECOLOGY

## 2020-01-17 PROCEDURE — 90686 IIV4 VACC NO PRSV 0.5 ML IM: CPT | Performed by: OBSTETRICS & GYNECOLOGY

## 2020-01-17 NOTE — PROGRESS NOTES
Prenatal Visit    Allen Urena is a 30 y.o. female [id number] at 18w1d    The patient was seen and evaluated. Reports positive fetal movements. She denies headache, vision changes, RUQ pain, contractions, vaginal bleeding and leakage of fluid. The patient is transferring care from the midwives due to her h/o postpartum PE and h/o indicated PTD at 29ws due to [address]. She reports she is compliant with her daily ASA and lovenox. The problem list reflects the active issues addressed during today's visit    Vitals:    BP: 116/71  Weight: 186 lb 9.6 oz (84.6 kg)  Pulse: 106  Patient Position: Sitting  Fetal Heart Rate: 155  Movement: Present       Assessment & Plan:  Allen Urena is a 30 y.o. female  at 21w2d   - Encouraged compliance with initial prenatal labs and baseline preE labs. - The Nuchal Translucency testing was reviewed and found to be normal   - A single marker MSAFP was ordered. - The patients anatomy ultrasound has been scheduled at [place] [id number] appointment on 2/3   -  labor and kick count precautions given. - Signs and symptoms of preeclampsia reviewed. - Flu vaccine to be given today. Patient Active Problem List    Diagnosis Date Noted    History of postpartum depression 2020     No meds required      Flu vaccine need 2020     Given 2020      Family history of neural tube defect 2019     Spina bifida      High risk pregnancy, antepartum 2019            Bacteriuria 2018    H/O Postpartum PE 2018     MFM referral placed  On daily ASA and lovenox      Elevated liver enzymes 2018    H/O PreE (G2) 2017    H/O indicated PTD (G2 @ 29W) 2017     Return in about 4 weeks (around 2020) for Parva Baous 9038. The patient was instructed on fetal kick counts and was given a kick sheet to complete every 8 hours. This is to begin at 28 weeks gestation.  She was instructed that the baby should move at a minimum of ten times within one hour after a meal. The patient was instructed to lay down on her left side twenty minutes after eating and count movements for up to one hour with a target value of ten movements. She was instructed to notify the office if she did not make that target after two attempts or if after any attempt there was less than four movements.     DO Lazara Tao Ob/GYN  - [place]  1/17/2020 9:26 AM

## 2020-01-20 ENCOUNTER — HOSPITAL ENCOUNTER (EMERGENCY)
Age: 30
Discharge: HOME | End: 2020-01-21
Attending: EMERGENCY MEDICINE
Payer: COMMERCIAL

## 2020-01-20 VITALS
SYSTOLIC BLOOD PRESSURE: 107 MMHG | BODY MASS INDEX: 30.99 KG/M2 | WEIGHT: 186 LBS | OXYGEN SATURATION: 100 % | TEMPERATURE: 97.5 F | RESPIRATION RATE: 16 BRPM | HEIGHT: 65 IN | HEART RATE: 85 BPM | DIASTOLIC BLOOD PRESSURE: 69 MMHG

## 2020-01-20 PROCEDURE — 6370000000 HC RX 637 (ALT 250 FOR IP): Performed by: STUDENT IN AN ORGANIZED HEALTH CARE EDUCATION/TRAINING PROGRAM

## 2020-01-20 PROCEDURE — 6360000002 HC RX W HCPCS: Performed by: STUDENT IN AN ORGANIZED HEALTH CARE EDUCATION/TRAINING PROGRAM

## 2020-01-20 PROCEDURE — 96375 TX/PRO/DX INJ NEW DRUG ADDON: CPT

## 2020-01-20 PROCEDURE — 2580000003 HC RX 258: Performed by: STUDENT IN AN ORGANIZED HEALTH CARE EDUCATION/TRAINING PROGRAM

## 2020-01-20 PROCEDURE — 99283 EMERGENCY DEPT VISIT LOW MDM: CPT

## 2020-01-20 PROCEDURE — 96374 THER/PROPH/DIAG INJ IV PUSH: CPT

## 2020-01-20 RX ORDER — 0.9 % SODIUM CHLORIDE 0.9 %
1000 INTRAVENOUS SOLUTION INTRAVENOUS ONCE
Status: COMPLETED | OUTPATIENT
Start: 2020-01-20 | End: 2020-01-21

## 2020-01-20 RX ORDER — ACETAMINOPHEN 500 MG
1000 TABLET ORAL ONCE
Status: COMPLETED | OUTPATIENT
Start: 2020-01-20 | End: 2020-01-20

## 2020-01-20 RX ORDER — METOCLOPRAMIDE HYDROCHLORIDE 5 MG/ML
10 INJECTION INTRAMUSCULAR; INTRAVENOUS ONCE
Status: COMPLETED | OUTPATIENT
Start: 2020-01-20 | End: 2020-01-20

## 2020-01-20 RX ORDER — DIPHENHYDRAMINE HYDROCHLORIDE 50 MG/ML
25 INJECTION INTRAMUSCULAR; INTRAVENOUS ONCE
Status: COMPLETED | OUTPATIENT
Start: 2020-01-20 | End: 2020-01-20

## 2020-01-20 RX ADMIN — METOCLOPRAMIDE 10 MG: 5 INJECTION, SOLUTION INTRAMUSCULAR; INTRAVENOUS at 22:51

## 2020-01-20 RX ADMIN — ACETAMINOPHEN 1000 MG: 500 TABLET ORAL at 22:52

## 2020-01-20 RX ADMIN — SODIUM CHLORIDE 1000 ML: 9 INJECTION, SOLUTION INTRAVENOUS at 22:53

## 2020-01-20 RX ADMIN — DIPHENHYDRAMINE HYDROCHLORIDE 25 MG: 50 INJECTION, SOLUTION INTRAMUSCULAR; INTRAVENOUS at 22:52

## 2020-01-20 ASSESSMENT — PAIN DESCRIPTION - DESCRIPTORS: DESCRIPTORS: THROBBING;ACHING

## 2020-01-20 ASSESSMENT — PAIN DESCRIPTION - FREQUENCY: FREQUENCY: CONTINUOUS

## 2020-01-20 ASSESSMENT — PAIN DESCRIPTION - LOCATION: LOCATION: HEAD

## 2020-01-20 ASSESSMENT — PAIN SCALES - GENERAL
PAINLEVEL_OUTOF10: 10
PAINLEVEL_OUTOF10: 9

## 2020-01-20 ASSESSMENT — PAIN DESCRIPTION - PAIN TYPE: TYPE: ACUTE PAIN

## 2020-01-21 RX ORDER — ACETAMINOPHEN 500 MG
1000 TABLET ORAL EVERY 6 HOURS PRN
Qty: 30 TABLET | Refills: 0 | Status: SHIPPED | OUTPATIENT
Start: 2020-01-21

## 2020-01-21 ASSESSMENT — ENCOUNTER SYMPTOMS
PHOTOPHOBIA: 1
NAUSEA: 1
SHORTNESS OF BREATH: 0
ABDOMINAL PAIN: 0
VOMITING: 1

## 2020-01-21 NOTE — ED PROVIDER NOTES
[facility] ED  Emergency Department EncounterMedicine Resident     Pt Name: Keo Sunshine  MRN: [id number]  Adalbertogfcarli 1990  Date ofevaluation: 20  PCP:  Siva Davis Dr       Chief Complaint   Patient presents with    Migraine    Nausea     HISTORY OF PRESENT ILLNESS  (Location/Symptom, Timing/Onset, Context/Setting, Quality, Duration, Modifying Factors, Severity.)      Keo Sunshine is a 30 y.o. female , 18 weeks gestation, w/ hx of migraine headaches, here w/ c/o frontal bilateral pulsating migraine headache x 4 days w/ associated nausea, 1 episode of nonbloody nonbilious vomiting yesterday, photophobia. Headache started off gradually and has been progressively worsening. Patient has had a headache intermittently over the past several weeks of her pregnancy. Patient has a hx of preeclampsia during prior pregnancy. Has a hx of PE in the post partum period; currently on Lovenox. She has been taking Tylenol with partial relief. No neurologic complaints, no weakness, numbness, tingling, no fever, chills, no neck pain or stiffness. REVIEW OF SYSTEMS    (2-9 systems for level 4, 10 or more for level 5)      Review of Systems   Constitutional: Negative for chills and fever. HENT: Negative for congestion. Eyes: Positive for photophobia. Respiratory: Negative for shortness of breath. Cardiovascular: Negative for chest pain. Gastrointestinal: Positive for nausea and vomiting. Negative for abdominal pain. Musculoskeletal: Negative for gait problem. Neurological: Positive for headaches. Negative for dizziness, syncope, facial asymmetry, weakness and numbness. Psychiatric/Behavioral: Negative for confusion. PAST MEDICAL / SURGICAL /SOCIAL / FAMILY HISTORY      has a past medical history of Depression and Pulmonary embolism ([facility] [address] has no past surgical history on file.       Social History     Socioeconomic History    Marital status:      Spouse name: Not on file    Number of children: Not on file    Years of education: Not on file    Highest education level: Not on file   Occupational History    Not on file   Social Needs    Financial resource strain: Not on file    Food insecurity:     Worry: Not on file     Inability: Not on file    Transportation needs:     Medical: Not on file     Non-medical: Not on file   Tobacco Use    Smoking status: Never Smoker    Smokeless tobacco: Never Used   Substance and Sexual Activity    Alcohol use: No     Alcohol/week: 2.0 standard drinks     Types: 1 Glasses of wine, 1 Shots of liquor per week     Comment: not with pregnancy    Drug use: No    Sexual activity: Yes     Partners: Male     Comment: no birth control used   Lifestyle    Physical activity:     Days per week: Not on file     Minutes per session: Not on file    Stress: Not on file   Relationships    Social connections:     Talks on phone: Not on file     Gets together: Not on file     Attends Jain service: Not on file     Active member of club or organization: Not on file     Attends meetings of clubs or organizations: Not on file     Relationship status: Not on file    Intimate partner violence:     Fear of current or ex partner: Not on file     Emotionally abused: Not on file     Physically abused: Not on file     Forced sexual activity: Not on file   Other Topics Concern    Not on file   Social History Narrative    ** Merged History Encounter **              Family History   Problem Relation Age of Onset    Depression Mother     Cancer Paternal Aunt         thyroid    Alcohol Abuse Paternal Uncle     Allergy (Severe) Maternal Grandmother     Arthritis Maternal Grandmother     Asthma Maternal Grandmother     Diabetes Maternal Grandmother     High Blood Pressure Maternal Grandmother     Kidney Disease Maternal Grandmother     Diabetes Maternal Grandfather     High Blood Pressure Maternal Grandfather     ibuprofen (ADVIL;MOTRIN) 800 MG tablet Take 1 tablet by mouth every 8 hours as needed for Pain  Patient not taking: Reported on 10/23/2019 2/26/18   Laquita Johnson   docusate sodium (COLACE, DULCOLAX) 100 MG CAPS Take 100 mg by mouth 2 times daily as needed for Constipation  Patient not taking: Reported on 12/20/2019 2/26/18   Laquita Johnson   Prenatal Vit-Fe Fumarate-FA (PRENATAL VITAMINS) 28-0.8 MG TABS Take 1 tablet by mouth daily Please dispense any generic insurance will cover. 7/11/17   LINDA Sage - [facility]       PHYSICAL EXAM   (up to 7 for level 4, 8 or more for level 5)      INITIAL VITALS:    height is 5' 5\" (1.651 m) and weight is 186 lb (84.4 kg). Her oral temperature is 97.5 °F (36.4 °C). Her blood pressure is 107/69 and her pulse is 85. Her respiration is 16 and oxygen saturation is 100%. Physical Exam  Vitals signs reviewed. Constitutional:       General: She is not in acute distress. Appearance: Normal appearance. She is well-developed. She is not ill-appearing. HENT:      Head: Normocephalic and atraumatic. Mouth/Throat:      Mouth: Mucous membranes are moist.   Eyes:      Pupils: Pupils are equal, round, and reactive to light. Neck:      Musculoskeletal: Normal range of motion and neck supple. Cardiovascular:      Rate and Rhythm: Normal rate and regular rhythm. Pulses: Normal pulses. Heart sounds: Normal heart sounds. Pulmonary:      Effort: Pulmonary effort is normal.      Breath sounds: Normal breath sounds. Abdominal:      General: Bowel sounds are normal. There is no distension. Palpations: Abdomen is soft. Tenderness: There is no tenderness. There is no guarding or rebound. Musculoskeletal: Normal range of motion. General: No deformity. Skin:     General: Skin is warm and dry. Capillary Refill: Capillary refill takes less than 2 seconds. Neurological:      General: No focal deficit present.       Mental Status: She is as soon as possible for a visit       DISCHARGE MEDICATIONS:  Discharge Medication List as of 1/21/2020 12:19 AM      START taking these medications    Details   acetaminophen (TYLENOL) 500 MG tablet Take 2 tablets by mouth every 6 hours as needed for Pain, Disp-30 tablet, R-0Print           Mary Patel DO  Emergency Medicine Resident    (Please note that portions of this note were completed with a voice recognition program.  Efforts were made to edit the dictations but occasionally words are mis-transcribed.)      Andrew Howard DO  01/22/20 [id number]

## 2020-01-24 NOTE — ED PROVIDER NOTES
[facility]     Emergency Department     Faculty Attestation    I performed a history and physical examination of the patient and discussed management with the resident. I reviewed the residents note and agree with the documented findings and plan of care. Any areas of disagreement are noted on the chart. I was personally present for the key portions of any procedures. I have documented in the chart those procedures where I was not present during the key portions. I have reviewed the emergency nurses triage note. I agree with the chief complaint, past medical history, past surgical history, allergies, medications, social and family history as documented unless otherwise noted below. For Physician Assistant/ Nurse Practitioner cases/documentation I have personally evaluated this patient and have completed at least one if not all key elements of the E/M (history, physical exam, and MDM). Additional findings are as noted. I have personally seen and evaluated the patient. I find the patient's history and physical exam are consistent with the NP/PA documentation. I agree with the care provided, treatment rendered, disposition and follow-up plan. 4 days of headache, gradually worsening. History of pre-eclampsia with prior pregnancies, no hypertension here. No vision changes, neck pain, fever. Tylenol without improvement. Poor PO intake. On lovenox for history of PE, states compliance. FHT obtained by ultrasound, 135-140. Good fetal movement. Patient improved with reglan and IV fluids. No neurologic deficits or red-flag onset symptoms (thunderclap, worst of life) to suggest intracranial bleed. On lovenox, making venous sinus thrombus unlikely. Discussed with OB team, discharged for outpatient follow up.        Critical Care    Cassidy Ellis MD   Attending Emergency  Physician              Cassidy Ellis MD  01/24/20 [id number]

## 2020-02-03 ENCOUNTER — ROUTINE PRENATAL (OUTPATIENT)
Dept: PERINATAL CARE | Age: 30
End: 2020-02-03
Payer: COMMERCIAL

## 2020-02-03 VITALS
WEIGHT: 193 LBS | SYSTOLIC BLOOD PRESSURE: 114 MMHG | HEART RATE: 74 BPM | RESPIRATION RATE: 16 BRPM | HEIGHT: 65 IN | TEMPERATURE: 98 F | DIASTOLIC BLOOD PRESSURE: 70 MMHG | BODY MASS INDEX: 32.15 KG/M2

## 2020-02-03 PROCEDURE — 76811 OB US DETAILED SNGL FETUS: CPT | Performed by: OBSTETRICS & GYNECOLOGY

## 2020-02-03 PROCEDURE — 76817 TRANSVAGINAL US OBSTETRIC: CPT | Performed by: OBSTETRICS & GYNECOLOGY

## 2020-02-14 ENCOUNTER — HOSPITAL ENCOUNTER (OUTPATIENT)
Age: 30
Setting detail: SPECIMEN
Discharge: HOME | End: 2020-02-14

## 2020-02-14 ENCOUNTER — ROUTINE PRENATAL (OUTPATIENT)
Dept: OBGYN CLINIC | Age: 30
End: 2020-02-14

## 2020-02-14 VITALS
SYSTOLIC BLOOD PRESSURE: 116 MMHG | BODY MASS INDEX: 32.85 KG/M2 | WEIGHT: 197.4 LBS | DIASTOLIC BLOOD PRESSURE: 69 MMHG | HEART RATE: 96 BPM

## 2020-02-14 PROBLEM — O99.210 MATERNAL OBESITY AFFECTING PREGNANCY, ANTEPARTUM: Status: ACTIVE | Noted: 2020-02-14

## 2020-02-14 LAB
ABO/RH: NORMAL
ABSOLUTE EOS #: 0.14 K/UL (ref 0–0.44)
ABSOLUTE IMMATURE GRANULOCYTE: 0.04 K/UL (ref 0–0.3)
ABSOLUTE LYMPH #: 2.27 K/UL (ref 1.1–3.7)
ABSOLUTE MONO #: 0.4 K/UL (ref 0.1–1.2)
ALBUMIN SERPL-MCNC: 3.4 G/DL (ref 3.5–5.2)
ALBUMIN/GLOBULIN RATIO: 1.1 (ref 1–2.5)
ALP BLD-CCNC: 69 U/L (ref 35–104)
ALT SERPL-CCNC: 10 U/L (ref 5–33)
ANION GAP SERPL CALCULATED.3IONS-SCNC: 13 MMOL/L (ref 9–17)
ANTIBODY SCREEN: NEGATIVE
AST SERPL-CCNC: 14 U/L
BASOPHILS # BLD: 0 % (ref 0–2)
BASOPHILS ABSOLUTE: <0.03 K/UL (ref 0–0.2)
BILIRUB SERPL-MCNC: 0.17 MG/DL (ref 0.3–1.2)
BUN BLDV-MCNC: 6 MG/DL (ref 6–20)
BUN/CREAT BLD: ABNORMAL (ref 9–20)
CALCIUM SERPL-MCNC: 8.8 MG/DL (ref 8.6–10.4)
CHLORIDE BLD-SCNC: 106 MMOL/L (ref 98–107)
CO2: 19 MMOL/L (ref 20–31)
CREAT SERPL-MCNC: 0.37 MG/DL (ref 0.5–0.9)
CREATININE URINE: 118.9 MG/DL (ref 28–217)
DIFFERENTIAL TYPE: ABNORMAL
EOSINOPHILS RELATIVE PERCENT: 2 % (ref 1–4)
GFR AFRICAN AMERICAN: >60 ML/MIN
GFR NON-AFRICAN AMERICAN: >60 ML/MIN
GFR SERPL CREATININE-BSD FRML MDRD: ABNORMAL ML/MIN/{1.73_M2}
GFR SERPL CREATININE-BSD FRML MDRD: ABNORMAL ML/MIN/{1.73_M2}
GLUCOSE BLD-MCNC: 91 MG/DL (ref 70–99)
HCT VFR BLD CALC: 35.6 % (ref 36.3–47.1)
HEMOGLOBIN: 11.3 G/DL (ref 11.9–15.1)
HEPATITIS B SURFACE ANTIGEN: NONREACTIVE
IMMATURE GRANULOCYTES: 1 %
LYMPHOCYTES # BLD: 31 % (ref 24–43)
MCH RBC QN AUTO: 29 PG (ref 25.2–33.5)
MCHC RBC AUTO-ENTMCNC: 31.7 G/DL (ref 28.4–34.8)
MCV RBC AUTO: 91.3 FL (ref 82.6–102.9)
MONOCYTES # BLD: 6 % (ref 3–12)
NRBC AUTOMATED: 0 PER 100 WBC
PDW BLD-RTO: 14 % (ref 11.8–14.4)
PLATELET # BLD: 209 K/UL (ref 138–453)
PLATELET ESTIMATE: ABNORMAL
PMV BLD AUTO: 11.6 FL (ref 8.1–13.5)
POTASSIUM SERPL-SCNC: 3.9 MMOL/L (ref 3.7–5.3)
RBC # BLD: 3.9 M/UL (ref 3.95–5.11)
RBC # BLD: ABNORMAL 10*6/UL
RUBV IGG SER QL: >500 IU/ML
SEG NEUTROPHILS: 60 % (ref 36–65)
SEGMENTED NEUTROPHILS ABSOLUTE COUNT: 4.36 K/UL (ref 1.5–8.1)
SODIUM BLD-SCNC: 138 MMOL/L (ref 135–144)
T. PALLIDUM, IGG: NONREACTIVE
TOTAL PROTEIN, URINE: 12 MG/DL
TOTAL PROTEIN: 6.4 G/DL (ref 6.4–8.3)
URIC ACID: 3 MG/DL (ref 2.4–5.7)
URINE TOTAL PROTEIN CREATININE RATIO: 0.1 (ref 0–0.2)
WBC # BLD: 7.2 K/UL (ref 3.5–11.3)
WBC # BLD: ABNORMAL 10*3/UL

## 2020-02-14 PROCEDURE — 0502F SUBSEQUENT PRENATAL CARE: CPT | Performed by: OBSTETRICS & GYNECOLOGY

## 2020-02-14 NOTE — PROGRESS NOTES
instructed to notify the office if she did not make that target after two attempts or if after any attempt there was less than four movements.     Fariha Thomas, DO [facility] [facility] Howell  2/14/2020 8:27 AM

## 2020-02-17 LAB
AFP INTERPRETATION: NORMAL
AFP MOM: 1.8
AFP SPECIMEN: NORMAL
AFP: 136 NG/ML
DATE OF BIRTH: NORMAL
DATING METHOD: NORMAL
DETERMINED BY: NORMAL
DIABETIC: NORMAL
DUE DATE: NORMAL
ESTIMATED DUE DATE: NORMAL
FAMILY HISTORY NTD: NORMAL
GESTATIONAL AGE: NORMAL
INSULIN REQ DIABETES: NO
LAST MENSTRUAL PERIOD: NORMAL
MATERNAL AGE AT EDD: 28.6 YR
MATERNAL WEIGHT: 187
MONOCHORIONIC TWINS: NORMAL
NUMBER OF FETUSES: NORMAL
PATIENT WEIGHT UNITS: NORMAL
PATIENT WEIGHT: NORMAL
RACE (MATERNAL): NORMAL
RACE: NORMAL
REPEAT SPECIMEN?: NO
SMOKING: NO
SMOKING: NORMAL
VALPROIC/CARBAMAZEP: NORMAL
ZZ NTE CLEAN UP: HISTORY: NO

## 2020-03-16 ENCOUNTER — TELEPHONE (OUTPATIENT)
Dept: OBGYN CLINIC | Age: 30
End: 2020-03-16

## 2020-03-23 ENCOUNTER — ROUTINE PRENATAL (OUTPATIENT)
Dept: OBGYN CLINIC | Age: 30
End: 2020-03-23

## 2020-03-23 VITALS
HEART RATE: 100 BPM | BODY MASS INDEX: 32.95 KG/M2 | SYSTOLIC BLOOD PRESSURE: 108 MMHG | DIASTOLIC BLOOD PRESSURE: 69 MMHG | WEIGHT: 198 LBS

## 2020-03-23 PROCEDURE — 0502F SUBSEQUENT PRENATAL CARE: CPT | Performed by: OBSTETRICS & GYNECOLOGY

## 2020-03-31 ENCOUNTER — TELEPHONE (OUTPATIENT)
Dept: OBGYN CLINIC | Age: 30
End: 2020-03-31

## 2020-04-05 ENCOUNTER — HOSPITAL ENCOUNTER (OUTPATIENT)
Age: 30
Discharge: HOME | End: 2020-04-05
Attending: OBSTETRICS & GYNECOLOGY | Admitting: OBSTETRICS & GYNECOLOGY
Payer: MEDICAID

## 2020-04-05 VITALS
DIASTOLIC BLOOD PRESSURE: 70 MMHG | HEART RATE: 92 BPM | TEMPERATURE: 97.9 F | RESPIRATION RATE: 16 BRPM | SYSTOLIC BLOOD PRESSURE: 131 MMHG

## 2020-04-05 PROBLEM — Z91.199 NON-COMPLIANCE: Status: ACTIVE | Noted: 2020-04-05

## 2020-04-05 PROBLEM — O09.93 HRP (HIGH RISK PREGNANCY), THIRD TRIMESTER: Status: ACTIVE | Noted: 2020-04-05

## 2020-04-05 LAB
BILIRUBIN URINE: NEGATIVE
COLOR: YELLOW
COMMENT UA: NORMAL
DIRECT EXAM: NORMAL
FETAL FIBRONECTIN APPEARANCE: NORMAL
FETAL FIBRONECTIN: NEGATIVE
GLUCOSE URINE: NEGATIVE
KETONES, URINE: NEGATIVE
LEUKOCYTE ESTERASE, URINE: NEGATIVE
Lab: NORMAL
NITRITE, URINE: NEGATIVE
PH UA: 7 (ref 5–8)
PROTEIN UA: NEGATIVE
SPECIFIC GRAVITY UA: 1.02 (ref 1–1.03)
SPECIMEN DESCRIPTION: NORMAL
TURBIDITY: CLEAR
URINE HGB: NEGATIVE
UROBILINOGEN, URINE: NORMAL

## 2020-04-05 PROCEDURE — 81003 URINALYSIS AUTO W/O SCOPE: CPT

## 2020-04-05 PROCEDURE — 82731 ASSAY OF FETAL FIBRONECTIN: CPT

## 2020-04-05 PROCEDURE — 87510 GARDNER VAG DNA DIR PROBE: CPT

## 2020-04-05 PROCEDURE — 87480 CANDIDA DNA DIR PROBE: CPT

## 2020-04-05 PROCEDURE — 87591 N.GONORRHOEAE DNA AMP PROB: CPT

## 2020-04-05 PROCEDURE — 87491 CHLMYD TRACH DNA AMP PROBE: CPT

## 2020-04-05 PROCEDURE — 99213 OFFICE O/P EST LOW 20 MIN: CPT

## 2020-04-05 PROCEDURE — 87660 TRICHOMONAS VAGIN DIR PROBE: CPT

## 2020-04-05 RX ORDER — ACETAMINOPHEN 325 MG/1
650 TABLET ORAL EVERY 4 HOURS PRN
Status: DISCONTINUED | OUTPATIENT
Start: 2020-04-05 | End: 2020-04-05 | Stop reason: HOSPADM

## 2020-04-05 NOTE — H&P
OBSTETRICAL HISTORY [facility]    Date: 2020       Time: 3:00 PM   Patient Name: Dustin Mckoy     Patient : 1990  Room/Bed: [place] [address]    Admission Date/Time: 2020  1:25 PM      CC: contractions     HPI: Dustin Mckoy is a 30 y.o.  at 29w3d who presents c/o contractions that began at midnight last night. She states they have remained the same in timing and intensity since then. She admits to intercourse but thinks it was >24 hours ago. She denies dysuria, urgency, or frequency. The patient reports fetal movement is present, complains of contractions, denies loss of fluid, denies vaginal bleeding. Denies s/s of preeclampsia including headache, vision changes,difficulty breathing, chest pain, RUQ pain or worsening swelling of extremities. Pregnancy is complicated by Hx Indicated PTD 2/2 PreE at 34 w, Hx PP Pulm Embolism (on lovenox),     DATING:  LMP: Patient's last menstrual period was 2019 (exact date).   Estimated Date of Delivery: 20   Based on: LMP, cw us at 13 3/7 weeks GA    PREGNANCY RISK FACTORS:  Patient Active Problem List   Diagnosis    H/O PreE (G2)    H/O indicated PTD (G2 @ 27W)    H/O Postpartum PE    Elevated liver enzymes    Bacteriuria    High risk pregnancy, antepartum    Family history of neural tube defect    History of postpartum depression    Flu vaccine need    Maternal obesity    HRP (high risk pregnancy), third trimester    Non-compliance        Steroids Given In This Pregnancy:  no     REVIEW OF SYSTEMS:   Constitutional: negative fever, negative chills, negative weight changes   HEENT: negative visual disturbances, negative headaches, negative dizziness, negative hearing loss  Breast: Negative breast abnormalities, negative breast lumps, negative nipple discharge  Respiratory: negative dyspnea, negative cough, negative SOB  Cardiovascular: negative chest pain,  negative palpitations, negative arrhythmia, negative syncope   Gastrointestinal: + abdominal pain, negative RUQ pain, negative N/V, negative diarrhea, negative constipation, negative bowel changes, negative heartburn   Genitourinary: negative dysuria, negative hematuria, negative urinary incontinence, negative vaginal discharge  Dermatological: negative rash, negative pruritis, negative mole or other skin changes  Hematologic: negative bruising  Immunologic/Lymphatic: negative recent illness, negative recent sick contact  Musculoskeletal: negative back pain, negative myalgias, negative arthralgias  Neurological:  negative dizziness, negative migraines, negative seizures, negative weakness  Behavior/Psych: negative depression, negative anxiety, negative SI, negative HI        OBSTETRICAL HISTORY:   OB History    Para Term  AB Living   4 3 2 1 0 3   SAB TAB Ectopic Molar Multiple Live Births   0 0 0 0 0 1      # Outcome Date GA Lbr Epifanio/2nd Weight Sex Delivery Anes PTL Lv   4 Current            3 Term 18 38w5d 06:05 / 00:03 7 lb 7.2 oz (3.38 kg) F Vag-Spont None N LETITIA      Name: Ward Large: 9  Apgar5: 9   2  10/10/15 29w0d  2 lb 6 oz (1.077 kg) F Vag-Spont         Birth Comments: pre-eclampsia   1 Term 09 38w0d  5 lb 1 oz (2.296 kg) F Vag-Spont          PAST MEDICAL HISTORY:   has a past medical history of Depression and Pulmonary embolism ([facility] [address] PAST SURGICAL HISTORY:   has no past surgical history on file. ALLERGIES:  has No Known Allergies. MEDICATIONS:  Prior to Admission medications    Medication Sig Start Date End Date Taking?  Authorizing Provider   acetaminophen (TYLENOL) 500 MG tablet Take 2 tablets by mouth every 6 hours as needed for Pain 20  Yes Mary Patel, DO   enoxaparin (LOVENOX) 80 MG/0.8ML injection Inject 0.8 mLs into the skin 2 times daily 18  Yes Irene Lopez MD   Prenatal Vit-Fe Fumarate-FA (PRENATAL VITAMINS) 28-0.8 MG TABS Take 1 tablet by mouth daily Please dispense any generic insurance will cover. 7/11/17  Yes Bradrebekacharlee Suárez, APRN - [facility]   docusate sodium (COLACE, DULCOLAX) 100 MG CAPS Take 100 mg by mouth 2 times daily as needed for Constipation  Patient not taking: Reported on 2/14/2020 2/26/18   Laquita Teran       FAMILY HISTORY:  family history includes Alcohol Abuse in her paternal uncle; Allergy (Severe) in her maternal grandmother; Arthritis in her maternal grandmother and paternal grandfather; Asthma in her maternal grandmother; Cancer in her paternal aunt; Depression in her mother; Diabetes in her maternal grandfather, maternal grandmother, paternal grandfather, and paternal grandmother; Heart Attack in her paternal grandfather; High Blood Pressure in her maternal grandfather, maternal grandmother, paternal grandfather, and paternal grandmother; Kidney Disease in her maternal grandmother. SOCIAL HISTORY:   reports that she has never smoked. She has never used smokeless tobacco. She reports that she does not drink alcohol or use drugs.     VITALS:  Vitals:    04/05/20 1336   BP: 131/70   Pulse: 92   Resp: 16   Temp: 97.9 °F (36.6 °C)   TempSrc: Oral         PHYSICAL EXAM:  Fetal Heart Monitor:  Baseline Heart Rate 150, moderate variability, present accelerations, absent decelerations  Ballinger: contractions, rare    General appearance:  no apparent distress, alert and cooperative  HEENT: head atraumatic, normocephalic, moist mucous membranes, trachea midline  Neurologic:  alert, oriented, normal speech, no focal findings or movement disorder noted  Lungs:  No increased work of breathing, good air exchange, clear to auscultation bilaterally, no crackles or wheezing  Heart:  regular rate and rhythm and no murmur, rubs, gallops  Abdomen:  soft, gravid, non-tender, no right upper quadrant tenderness, no CVA tenderness, uterus non-tender, no signs of abruption and no signs of chorioamnionitis  Extremities:  no calf

## 2020-04-05 NOTE — FLOWSHEET NOTE
Pt presents with complaints of ctx Q30-35 minutes. Pt states shes having mild ctx pain and has had a child at 29 weeks previously. Pt states she is having a lot of bck pain and some pressure. Pt Denies any loss of fluid, vaginal bleeding, positive fetal movement. VS taken, EFM applied. Resident Dr Ingrid Cortés notified.   FHR- 155

## 2020-04-05 NOTE — FLOWSHEET NOTE
Pt arrives to LD via wheelchair with complaints of contractions since the am. Pt called physician and was advised to come. Pt taken to Triage 1, gown CCUS and EFM applied.

## 2020-04-06 LAB
C TRACH DNA GENITAL QL NAA+PROBE: NEGATIVE
N. GONORRHOEAE DNA: NEGATIVE
SPECIMEN DESCRIPTION: NORMAL

## 2020-04-09 ENCOUNTER — HOSPITAL ENCOUNTER (OUTPATIENT)
Age: 30
Discharge: HOME | End: 2020-04-09
Payer: MEDICAID

## 2020-04-09 LAB
GLUCOSE ADMINISTRATION: NORMAL
GLUCOSE TOLERANCE SCREEN 50G: 134 MG/DL (ref 70–135)
HCT VFR BLD CALC: 36.1 % (ref 36.3–47.1)
HEMOGLOBIN: 11.7 G/DL (ref 11.9–15.1)
HIV AG/AB: NONREACTIVE
MCH RBC QN AUTO: 29.4 PG (ref 25.2–33.5)
MCHC RBC AUTO-ENTMCNC: 32.4 G/DL (ref 28.4–34.8)
MCV RBC AUTO: 90.7 FL (ref 82.6–102.9)
NRBC AUTOMATED: 0 PER 100 WBC
PDW BLD-RTO: 13.6 % (ref 11.8–14.4)
PLATELET # BLD: 252 K/UL (ref 138–453)
PMV BLD AUTO: 10.9 FL (ref 8.1–13.5)
RBC # BLD: 3.98 M/UL (ref 3.95–5.11)
WBC # BLD: 8.7 K/UL (ref 3.5–11.3)

## 2020-04-09 PROCEDURE — 36415 COLL VENOUS BLD VENIPUNCTURE: CPT

## 2020-04-09 PROCEDURE — 85027 COMPLETE CBC AUTOMATED: CPT

## 2020-04-09 PROCEDURE — 87389 HIV-1 AG W/HIV-1&-2 AB AG IA: CPT

## 2020-04-09 PROCEDURE — 82950 GLUCOSE TEST: CPT

## 2020-04-13 ENCOUNTER — ROUTINE PRENATAL (OUTPATIENT)
Dept: PERINATAL CARE | Age: 30
End: 2020-04-13
Payer: MEDICAID

## 2020-04-13 VITALS
HEART RATE: 70 BPM | SYSTOLIC BLOOD PRESSURE: 96 MMHG | WEIGHT: 202 LBS | HEIGHT: 65 IN | TEMPERATURE: 98.9 F | RESPIRATION RATE: 16 BRPM | BODY MASS INDEX: 33.66 KG/M2 | DIASTOLIC BLOOD PRESSURE: 58 MMHG

## 2020-04-13 PROCEDURE — 76805 OB US >/= 14 WKS SNGL FETUS: CPT | Performed by: OBSTETRICS & GYNECOLOGY

## 2020-04-13 PROCEDURE — 76819 FETAL BIOPHYS PROFIL W/O NST: CPT | Performed by: OBSTETRICS & GYNECOLOGY

## 2020-04-21 ENCOUNTER — TELEMEDICINE (OUTPATIENT)
Dept: OBGYN CLINIC | Age: 30
End: 2020-04-21

## 2020-04-21 VITALS — DIASTOLIC BLOOD PRESSURE: 82 MMHG | WEIGHT: 208 LBS | BODY MASS INDEX: 34.61 KG/M2 | SYSTOLIC BLOOD PRESSURE: 130 MMHG

## 2020-04-21 PROCEDURE — 0502F SUBSEQUENT PRENATAL CARE: CPT | Performed by: OBSTETRICS & GYNECOLOGY

## 2020-04-21 NOTE — PROGRESS NOTES
TELEHEALTH EVALUATION -- Audio/Visual (During STYSI-27 public health emergency)    HPI:    Suhas Benitez has requested an audio/video evaluation for the following concern(s): prenatal visit @ 31w5d    She is doing well without any complaints. She denies any ctxs, LOF, vaginal bleeding or discharge. She reports +FM. She denies any headaches, changes in vision, RUQ pain or increased swelling. She was having some [place] ctxs. She denies any fevers/chills, SOB or cough. She is taking her lovenox and baby ASA daily. Current Outpatient Medications   Medication Sig Dispense Refill    acetaminophen (TYLENOL) 500 MG tablet Take 2 tablets by mouth every 6 hours as needed for Pain 30 tablet 0    enoxaparin (LOVENOX) 80 MG/0.8ML injection Inject 0.8 mLs into the skin 2 times daily 60 Syringe 2    Prenatal Vit-Fe Fumarate-FA (PRENATAL VITAMINS) 28-0.8 MG TABS Take 1 tablet by mouth daily Please dispense any generic insurance will cover. 30 tablet 12     No current facility-administered medications for this visit.          Social History     Socioeconomic History    Marital status:      Spouse name: Not on file    Number of children: Not on file    Years of education: Not on file    Highest education level: Not on file   Occupational History    Not on file   Social Needs    Financial resource strain: Not on file    Food insecurity     Worry: Not on file     Inability: Not on file    Transportation needs     Medical: Not on file     Non-medical: Not on file   Tobacco Use    Smoking status: Never Smoker    Smokeless tobacco: Never Used   Substance and Sexual Activity    Alcohol use: No     Alcohol/week: 2.0 standard drinks     Types: 1 Glasses of wine, 1 Shots of liquor per week     Comment: not with pregnancy    Drug use: No    Sexual activity: Yes     Partners: Male     Comment: no birth control used   Lifestyle    Physical activity     Days per week: Not on file     Minutes per session: Not on file    Stress: Not on file   Relationships    Social connections     Talks on phone: Not on file     Gets together: Not on file     Attends Synagogue service: Not on file     Active member of club or organization: Not on file     Attends meetings of clubs or organizations: Not on file     Relationship status: Not on file    Intimate partner violence     Fear of current or ex partner: Not on file     Emotionally abused: Not on file     Physically abused: Not on file     Forced sexual activity: Not on file   Other Topics Concern    Not on file   Social History Narrative    ** Merged History Encounter **               Physical Exam  Constitutional:       Appearance: Normal appearance. Eyes:      Extraocular Movements: Extraocular movements intact. Neurological:      Mental Status: She is alert and oriented to person, place, and time. Psychiatric:         Mood and Affect: Mood normal.         Behavior: Behavior normal.         Thought Content:  Thought content normal.         Judgment: Judgment normal.         Pt did BP at home and it was 130/82    ASSESSMENT/PLAN:  Patient Active Problem List    Diagnosis Date Noted    HRP (high risk pregnancy), third trimester 04/05/2020    Non-compliance 04/05/2020     Cancelled and no showed multiple prenatal appointments       Maternal obesity 02/14/2020     Early 1h GTT ordered, not done      History of postpartum depression 01/17/2020     No meds required      Flu vaccine need 01/17/2020     Given 1/17/2020      Family history of neural tube defect 12/17/2019     Spina bifida      High risk pregnancy, antepartum 12/16/2019            Bacteriuria 05/05/2018    H/O Postpartum PE 05/03/2018     MFM referral placed  On daily ASA and lovenox      Elevated liver enzymes 05/03/2018    H/O PreE (G2) 07/11/2017    H/O indicated PTD (G2 @ 29W) 07/11/2017     Tdap next visit  Pt to follow up in 2 wks for Comfort Leon is a 30 y.o. female being evaluated by a Virtual Visit (video visit) encounter to address concerns as mentioned above. A caregiver was present when appropriate. Due to this being a TeleHealth encounter (During VPPYE-14 public health emergency), evaluation of the following organ systems was limited: Vitals/Constitutional/EENT/Resp/CV/GI//MS/Neuro/Skin/Heme-Lymph-Imm. Pursuant to the emergency declaration under the [address] [place] [address] and the [employer] Act, this Virtual Visit was conducted with patient's (and/or legal guardian's) consent, to reduce the patient's risk of exposure to COVID-19 and provide necessary medical care. The patient (and/or legal guardian) has also been advised to contact this office for worsening conditions or problems, and seek emergency medical treatment and/or call 911 if deemed necessary. Services were provided through a video synchronous discussion virtually to substitute for in-person clinic visit. Patient and provider were located at their individual homes. Darek Whittington MD      An electronic signature was used to authenticate this note.

## 2020-05-05 ENCOUNTER — ROUTINE PRENATAL (OUTPATIENT)
Dept: OBGYN CLINIC | Age: 30
End: 2020-05-05
Payer: MEDICAID

## 2020-05-05 VITALS — BODY MASS INDEX: 34.61 KG/M2 | DIASTOLIC BLOOD PRESSURE: 66 MMHG | SYSTOLIC BLOOD PRESSURE: 116 MMHG | WEIGHT: 208 LBS

## 2020-05-05 PROCEDURE — G8427 DOCREV CUR MEDS BY ELIG CLIN: HCPCS | Performed by: OBSTETRICS & GYNECOLOGY

## 2020-05-05 PROCEDURE — 99213 OFFICE O/P EST LOW 20 MIN: CPT | Performed by: OBSTETRICS & GYNECOLOGY

## 2020-05-05 PROCEDURE — 1036F TOBACCO NON-USER: CPT | Performed by: OBSTETRICS & GYNECOLOGY

## 2020-05-05 PROCEDURE — G8419 CALC BMI OUT NRM PARAM NOF/U: HCPCS | Performed by: OBSTETRICS & GYNECOLOGY

## 2020-05-05 PROCEDURE — 90471 IMMUNIZATION ADMIN: CPT | Performed by: OBSTETRICS & GYNECOLOGY

## 2020-05-05 PROCEDURE — 90715 TDAP VACCINE 7 YRS/> IM: CPT | Performed by: OBSTETRICS & GYNECOLOGY

## 2020-05-19 ENCOUNTER — ROUTINE PRENATAL (OUTPATIENT)
Dept: OBGYN CLINIC | Age: 30
End: 2020-05-19
Payer: MEDICAID

## 2020-05-19 ENCOUNTER — HOSPITAL ENCOUNTER (OUTPATIENT)
Age: 30
Setting detail: SPECIMEN
Discharge: HOME | End: 2020-05-19
Payer: MEDICAID

## 2020-05-19 VITALS
HEART RATE: 104 BPM | BODY MASS INDEX: 35.45 KG/M2 | SYSTOLIC BLOOD PRESSURE: 136 MMHG | DIASTOLIC BLOOD PRESSURE: 82 MMHG | WEIGHT: 213 LBS

## 2020-05-19 PROCEDURE — G8419 CALC BMI OUT NRM PARAM NOF/U: HCPCS | Performed by: OBSTETRICS & GYNECOLOGY

## 2020-05-19 PROCEDURE — 99213 OFFICE O/P EST LOW 20 MIN: CPT | Performed by: OBSTETRICS & GYNECOLOGY

## 2020-05-19 PROCEDURE — 1036F TOBACCO NON-USER: CPT | Performed by: OBSTETRICS & GYNECOLOGY

## 2020-05-19 PROCEDURE — G8427 DOCREV CUR MEDS BY ELIG CLIN: HCPCS | Performed by: OBSTETRICS & GYNECOLOGY

## 2020-05-20 ENCOUNTER — TELEPHONE (OUTPATIENT)
Dept: OBGYN CLINIC | Age: 30
End: 2020-05-20

## 2020-05-22 LAB
CULTURE: NORMAL
Lab: NORMAL
SPECIMEN DESCRIPTION: NORMAL

## 2020-05-26 ENCOUNTER — TELEPHONE (OUTPATIENT)
Dept: OBGYN | Age: 30
End: 2020-05-26

## 2020-05-26 ENCOUNTER — TELEPHONE (OUTPATIENT)
Dept: PERINATAL CARE | Age: 30
End: 2020-05-26

## 2020-05-26 ENCOUNTER — ROUTINE PRENATAL (OUTPATIENT)
Dept: PERINATAL CARE | Age: 30
End: 2020-05-26
Payer: MEDICAID

## 2020-05-26 VITALS
HEART RATE: 117 BPM | BODY MASS INDEX: 35.4 KG/M2 | DIASTOLIC BLOOD PRESSURE: 72 MMHG | SYSTOLIC BLOOD PRESSURE: 121 MMHG | TEMPERATURE: 98.5 F | RESPIRATION RATE: 16 BRPM | HEIGHT: 65 IN | WEIGHT: 212.5 LBS

## 2020-05-26 LAB
ABDOMINAL CIRCUMFERENCE: NORMAL
BIPARIETAL DIAMETER: NORMAL
ESTIMATED FETAL WEIGHT: NORMAL
FEMORAL DIAMETER: NORMAL
HC/AC: NORMAL
HEAD CIRCUMFERENCE: NORMAL

## 2020-05-26 PROCEDURE — 76816 OB US FOLLOW-UP PER FETUS: CPT | Performed by: OBSTETRICS & GYNECOLOGY

## 2020-05-26 PROCEDURE — G8419 CALC BMI OUT NRM PARAM NOF/U: HCPCS | Performed by: OBSTETRICS & GYNECOLOGY

## 2020-05-26 PROCEDURE — 76819 FETAL BIOPHYS PROFIL W/O NST: CPT | Performed by: OBSTETRICS & GYNECOLOGY

## 2020-05-26 PROCEDURE — G8427 DOCREV CUR MEDS BY ELIG CLIN: HCPCS | Performed by: OBSTETRICS & GYNECOLOGY

## 2020-05-26 PROCEDURE — 99211 OFF/OP EST MAY X REQ PHY/QHP: CPT | Performed by: OBSTETRICS & GYNECOLOGY

## 2020-05-26 NOTE — PROGRESS NOTES
Karen Salcedo is a   @ 36w6d who presents for SPEEDY visit. She denies LOF, VB.  + FM. She is having occ ctxs. O:  Vitals:    20 0740   BP: 118/74   Pulse: 122     Gen: NAD  Abd: soft, nontender, gravid  Ext:  mild edema    FHT: 145  FH: 36 cm  SVE: 50/-2    A/P:  Patient Active Problem List    Diagnosis Date Noted    HRP (high risk pregnancy), third trimester 2020    Non-compliance 2020     Cancelled and no showed multiple prenatal appointments       Maternal obesity 2020     Early 1h GTT ordered, not done      History of postpartum depression 2020     No meds required      Flu vaccine need 2020     Given 2020      Family history of neural tube defect 2019     Spina bifida      High risk pregnancy, antepartum 2019            Bacteriuria 2018    H/O Postpartum PE 2018     MFM referral placed  On daily ASA and lovenox  20: On lovenox 80mg BID and is 36w5d today, recommend transition to heparin therapy, pt requested 15,000U heparin BID and is agreeable to test aPTT 6 hours after first dose of heparin which is ordered        Elevated liver enzymes 2018    Need for Tdap vaccination 2017      H/O PreE (G2) 2017    H/O indicated PTD (G2 @ 29W) 2017   GBS neg  Switched from lovenox to heparin  Discussed in great detail the growing concerns and associated hospital/institutional plans concerning COVID-19. Reviewed current available evidence and recommendations concerning outpatient and inpatient antepartum care in light of the pandemic. Discussed specific available CDC/ACOG/SMFM advisories including utilization of telehealth and limited inpatient admissions when able. Encouraged social distancing and appropriate hand washing/hygiene practices. Reviewed symptoms suspicious for infection. All questions answered to the best of my ability. Patient vocalized understanding.     Discussed s/sx that should prompt call to the office  Discussed kick counts  RTC in 1 wks    Consuelo Gordon MD

## 2020-05-26 NOTE — TELEPHONE ENCOUNTER
.Obstetric/Gynecology Maternal Fetal Medicine Resident Note    Pt has hx of postpartum pulmonary embolism and is now 36w5d GA. She is currently taking Lovenox 80mg BID and will need to be transitioned to heparin therapy due to her GA. Called pharmacy for recommendations on heparin regimen as pt weighs 96.4kg and pharmacy recommended heparin 250U/kg BID for a regimen of 24,100U BID (total of 48,200U daily) of heparin with aPTT to be drawn 6hours after and then once daily until therapeutic level and then 1x a week after that. Discussed this regimen with the patient versus a lower dose regimen of 12,000U TID (total of 36,000U daily). Pt declining both regimens and requesting 15,000U BID as she does not want to inject more than twice daily. Discussed risk of VTE, maternal death, stillbirth and pt requesting Heparin 15,000U BID at this time. Will call in Rx for heparin 15,000U BID at this time to [facility]. Pt agreeable to obtain aPTT level 6hours after first injection of Heparin. Will order lab for patient. Will forward this message to Dr. Brigida Piper as pt has appointment on 5/27/20. Dr. Yaz Angulo made aware.     Loise Lanes, DO OBGYN Resident, PGY2  [facility]  5/26/2020, 12:36 PM

## 2020-05-26 NOTE — PROGRESS NOTES
MFM Office Visit:  Patient to be converted today to unfractionated therapeutic subcutaneous heparin.

## 2020-05-27 ENCOUNTER — ROUTINE PRENATAL (OUTPATIENT)
Dept: OBGYN CLINIC | Age: 30
End: 2020-05-27
Payer: MEDICAID

## 2020-05-27 ENCOUNTER — TELEPHONE (OUTPATIENT)
Dept: OBGYN | Age: 30
End: 2020-05-27

## 2020-05-27 VITALS
HEART RATE: 122 BPM | WEIGHT: 212 LBS | DIASTOLIC BLOOD PRESSURE: 74 MMHG | SYSTOLIC BLOOD PRESSURE: 118 MMHG | BODY MASS INDEX: 35.28 KG/M2

## 2020-05-27 PROCEDURE — 99212 OFFICE O/P EST SF 10 MIN: CPT | Performed by: OBSTETRICS & GYNECOLOGY

## 2020-05-28 ENCOUNTER — TELEPHONE (OUTPATIENT)
Dept: PERINATAL CARE | Age: 30
End: 2020-05-28

## 2020-05-28 NOTE — TELEPHONE ENCOUNTER
Patient was notified by phone that our office was having issues getting Heparin 15,000 units twice a day approved that she would need to take Heparin 10, 000 units three x a day as Dr. Michael Longo instructed. Patient stated she was not going to start Heparin and was very argumentive.

## 2020-05-29 ENCOUNTER — TELEPHONE (OUTPATIENT)
Dept: OBGYN | Age: 30
End: 2020-05-29

## 2020-06-01 ENCOUNTER — ROUTINE PRENATAL (OUTPATIENT)
Dept: OBGYN CLINIC | Age: 30
End: 2020-06-01
Payer: MEDICAID

## 2020-06-01 VITALS
WEIGHT: 213 LBS | HEART RATE: 106 BPM | SYSTOLIC BLOOD PRESSURE: 117 MMHG | BODY MASS INDEX: 35.45 KG/M2 | DIASTOLIC BLOOD PRESSURE: 73 MMHG

## 2020-06-01 PROCEDURE — G8427 DOCREV CUR MEDS BY ELIG CLIN: HCPCS | Performed by: OBSTETRICS & GYNECOLOGY

## 2020-06-01 PROCEDURE — G8419 CALC BMI OUT NRM PARAM NOF/U: HCPCS | Performed by: OBSTETRICS & GYNECOLOGY

## 2020-06-01 PROCEDURE — 99213 OFFICE O/P EST LOW 20 MIN: CPT | Performed by: OBSTETRICS & GYNECOLOGY

## 2020-06-01 PROCEDURE — 1036F TOBACCO NON-USER: CPT | Performed by: OBSTETRICS & GYNECOLOGY

## 2020-06-01 NOTE — PROGRESS NOTES
Preston is a  @ 37w4d who presents for SPEEDY visit. She denies LOF, VB or Ctxs.  + FM. She is just feeling uncomfortable and having a lot of hip and pelvic pain and pressure. O:  Vitals:    20 1448   BP: 117/73   Pulse: 106     Gen: NAD  Abd: soft, nontender, gravid  Ext:  +1 edema    FHT: 155  FH: 36 cm    A/P:  Patient Active Problem List    Diagnosis Date Noted    HRP (high risk pregnancy), third trimester 2020    Non-compliance 2020     Cancelled and no showed multiple prenatal appointments       Maternal obesity 2020     Early 1h GTT ordered, not done      History of postpartum depression 2020     No meds required      Flu vaccine need 2020     Given 2020      Family history of neural tube defect 2019     Spina bifida      High risk pregnancy, antepartum 2019            Bacteriuria 2018    H/O Postpartum PE 2018     MFM referral placed  On daily ASA and lovenox  20: On lovenox 80mg BID and is 36w5d today, recommend transition to heparin therapy, pt requested 15,000U heparin BID and is agreeable to test aPTT 6 hours after first dose of heparin which is ordered        Elevated liver enzymes 2018    Need for Tdap vaccination 2017      H/O PreE (G2) 2017    H/O indicated PTD (G2 @ 29W) 2017     GBS neg    Discussed in great detail the growing concerns and associated hospital/institutional plans concerning COVID-19. Reviewed current available evidence and recommendations concerning outpatient and inpatient antepartum care in light of the pandemic. Discussed specific available CDC/ACOG/SMFM advisories including utilization of telehealth and limited inpatient admissions when able. Encouraged social distancing and appropriate hand washing/hygiene practices. Reviewed symptoms suspicious for infection. All questions answered to the best of my ability. Patient vocalized understanding.     Will strip membranes and discuss IOL at next visit  Discussed s/sx that should prompt call to the office  Discussed kick serge  RTC in 1 wks    Yves Johnson MD'

## 2020-06-02 ENCOUNTER — HOSPITAL ENCOUNTER (OUTPATIENT)
Age: 30
Discharge: HOME | End: 2020-06-02
Attending: OBSTETRICS & GYNECOLOGY | Admitting: OBSTETRICS & GYNECOLOGY
Payer: MEDICAID

## 2020-06-02 ENCOUNTER — TELEPHONE (OUTPATIENT)
Dept: OBGYN CLINIC | Age: 30
End: 2020-06-02

## 2020-06-02 VITALS
TEMPERATURE: 98.8 F | DIASTOLIC BLOOD PRESSURE: 57 MMHG | RESPIRATION RATE: 16 BRPM | HEART RATE: 105 BPM | SYSTOLIC BLOOD PRESSURE: 115 MMHG

## 2020-06-02 LAB
BILIRUBIN URINE: NEGATIVE
COLOR: YELLOW
COMMENT UA: ABNORMAL
GLUCOSE URINE: NEGATIVE
KETONES, URINE: ABNORMAL
LEUKOCYTE ESTERASE, URINE: NEGATIVE
NITRITE, URINE: NEGATIVE
PH UA: 6.5 (ref 5–8)
PROTEIN UA: NEGATIVE
SPECIFIC GRAVITY UA: 1.02 (ref 1–1.03)
TURBIDITY: CLEAR
URINE HGB: NEGATIVE
UROBILINOGEN, URINE: NORMAL

## 2020-06-02 PROCEDURE — 81003 URINALYSIS AUTO W/O SCOPE: CPT

## 2020-06-02 PROCEDURE — 99213 OFFICE O/P EST LOW 20 MIN: CPT

## 2020-06-02 RX ORDER — ACETAMINOPHEN 325 MG/1
650 TABLET ORAL EVERY 4 HOURS PRN
Status: DISCONTINUED | OUTPATIENT
Start: 2020-06-02 | End: 2020-06-02 | Stop reason: HOSPADM

## 2020-06-02 RX ORDER — PROMETHAZINE HYDROCHLORIDE 25 MG/1
12.5 TABLET ORAL EVERY 6 HOURS PRN
Status: DISCONTINUED | OUTPATIENT
Start: 2020-06-02 | End: 2020-06-02 | Stop reason: HOSPADM

## 2020-06-02 RX ORDER — ONDANSETRON 2 MG/ML
4 INJECTION INTRAMUSCULAR; INTRAVENOUS EVERY 6 HOURS PRN
Status: DISCONTINUED | OUTPATIENT
Start: 2020-06-02 | End: 2020-06-02 | Stop reason: HOSPADM

## 2020-06-02 NOTE — PROGRESS NOTES
OB/GYN Progress Note    Patient seen and examined after two hours of monitoring. She reports she still has the pelvic pressure. She denies an increase in contractions. She continues to deny vaginal bleeding or leaking of fluid. She reports positive fetal movement. FHT reviewed and reassuring. SVE performed and unchanged. Low suspician for labor at this time. Patient appears comfortable and declines tylenol. UA sent. Will follow up on results and call patient if suspicious for UTI. Stable for discharge. Follow up next week for prenatal appointment. Strict precautions discussed. Patient has no questions and vocalized understanding. Again counseled on importance of switching to heparin. RN present. Dr. Santo Weaver updated.      Vitals:    06/02/20 1326 06/02/20 1510   BP: 116/68 (!) 115/57   Pulse: 128 105   Resp: 16    Temp: 98.8 °F (37.1 °C)    TempSrc: Oral        Lita Natarajan  Ob-Gyn Resident PGY-3  Pager: [phone]

## 2020-06-05 ENCOUNTER — HOSPITAL ENCOUNTER (OUTPATIENT)
Dept: PREADMISSION TESTING | Age: 30
Setting detail: SPECIMEN
Discharge: HOME | End: 2020-06-09
Payer: MEDICAID

## 2020-06-05 PROCEDURE — U0003 INFECTIOUS AGENT DETECTION BY NUCLEIC ACID (DNA OR RNA); SEVERE ACUTE RESPIRATORY SYNDROME CORONAVIRUS 2 (SARS-COV-2) (CORONAVIRUS DISEASE [COVID-19]), AMPLIFIED PROBE TECHNIQUE, MAKING USE OF HIGH THROUGHPUT TECHNOLOGIES AS DESCRIBED BY CMS-2020-01-R: HCPCS

## 2020-06-06 LAB
SARS-COV-2, PCR: NORMAL
SARS-COV-2, RAPID: NORMAL
SARS-COV-2: NOT DETECTED
SOURCE: NORMAL

## 2020-06-07 ENCOUNTER — TELEPHONE (OUTPATIENT)
Dept: PRIMARY CARE CLINIC | Age: 30
End: 2020-06-07

## 2020-06-08 ENCOUNTER — HOSPITAL ENCOUNTER (INPATIENT)
Age: 30
LOS: 1 days | Discharge: HOME | DRG: 560 | End: 2020-06-09
Attending: OBSTETRICS & GYNECOLOGY | Admitting: OBSTETRICS & GYNECOLOGY
Payer: MEDICAID

## 2020-06-08 PROBLEM — Z3A.38 38 WEEKS GESTATION OF PREGNANCY: Status: RESOLVED | Noted: 2020-06-08 | Resolved: 2020-06-08

## 2020-06-08 PROBLEM — R82.71 BACTERIURIA: Status: RESOLVED | Noted: 2018-05-05 | Resolved: 2020-06-08

## 2020-06-08 PROBLEM — Z3A.38 38 WEEKS GESTATION OF PREGNANCY: Status: ACTIVE | Noted: 2020-06-08

## 2020-06-08 LAB
ABO/RH: NORMAL
ABSOLUTE EOS #: 0.04 K/UL (ref 0–0.44)
ABSOLUTE IMMATURE GRANULOCYTE: 0.07 K/UL (ref 0–0.3)
ABSOLUTE LYMPH #: 2.13 K/UL (ref 1.1–3.7)
ABSOLUTE MONO #: 0.7 K/UL (ref 0.1–1.2)
AMPHETAMINE SCREEN URINE: NEGATIVE
ANTIBODY SCREEN: NEGATIVE
ARM BAND NUMBER: NORMAL
BARBITURATE SCREEN URINE: NEGATIVE
BASOPHILS # BLD: 0 % (ref 0–2)
BASOPHILS ABSOLUTE: <0.03 K/UL (ref 0–0.2)
BENZODIAZEPINE SCREEN, URINE: NEGATIVE
BUPRENORPHINE URINE: NORMAL
CANNABINOID SCREEN URINE: NEGATIVE
COCAINE METABOLITE, URINE: NEGATIVE
DIFFERENTIAL TYPE: ABNORMAL
EOSINOPHILS RELATIVE PERCENT: 0 % (ref 1–4)
EXPIRATION DATE: NORMAL
HCT VFR BLD CALC: 34 % (ref 36.3–47.1)
HEMOGLOBIN: 10.9 G/DL (ref 11.9–15.1)
IMMATURE GRANULOCYTES: 1 %
INR BLD: 0.9
LYMPHOCYTES # BLD: 22 % (ref 24–43)
MCH RBC QN AUTO: 27.7 PG (ref 25.2–33.5)
MCHC RBC AUTO-ENTMCNC: 32.1 G/DL (ref 28.4–34.8)
MCV RBC AUTO: 86.3 FL (ref 82.6–102.9)
MDMA URINE: NORMAL
METHADONE SCREEN, URINE: NEGATIVE
METHAMPHETAMINE, URINE: NORMAL
MONOCYTES # BLD: 7 % (ref 3–12)
NRBC AUTOMATED: 0 PER 100 WBC
OPIATES, URINE: NEGATIVE
OXYCODONE SCREEN URINE: NEGATIVE
PARTIAL THROMBOPLASTIN TIME: 21.6 SEC (ref 20.5–30.5)
PDW BLD-RTO: 14.5 % (ref 11.8–14.4)
PHENCYCLIDINE, URINE: NEGATIVE
PLATELET # BLD: 270 K/UL (ref 138–453)
PLATELET ESTIMATE: ABNORMAL
PMV BLD AUTO: 10.3 FL (ref 8.1–13.5)
PROPOXYPHENE, URINE: NORMAL
PROTHROMBIN TIME: 9.6 SEC (ref 9–12)
RBC # BLD: 3.94 M/UL (ref 3.95–5.11)
RBC # BLD: ABNORMAL 10*6/UL
SEG NEUTROPHILS: 70 % (ref 36–65)
SEGMENTED NEUTROPHILS ABSOLUTE COUNT: 6.78 K/UL (ref 1.5–8.1)
T. PALLIDUM, IGG: NONREACTIVE
TEST INFORMATION: NORMAL
TRICYCLIC ANTIDEPRESSANTS, UR: NORMAL
WBC # BLD: 9.7 K/UL (ref 3.5–11.3)
WBC # BLD: ABNORMAL 10*3/UL

## 2020-06-08 PROCEDURE — 85025 COMPLETE CBC W/AUTO DIFF WBC: CPT

## 2020-06-08 PROCEDURE — 1220000000 HC SEMI PRIVATE OB R&B

## 2020-06-08 PROCEDURE — 83986 ASSAY PH BODY FLUID NOS: CPT

## 2020-06-08 PROCEDURE — 80307 DRUG TEST PRSMV CHEM ANLYZR: CPT

## 2020-06-08 PROCEDURE — 86900 BLOOD TYPING SEROLOGIC ABO: CPT

## 2020-06-08 PROCEDURE — 6370000000 HC RX 637 (ALT 250 FOR IP): Performed by: STUDENT IN AN ORGANIZED HEALTH CARE EDUCATION/TRAINING PROGRAM

## 2020-06-08 PROCEDURE — 85730 THROMBOPLASTIN TIME PARTIAL: CPT

## 2020-06-08 PROCEDURE — 2580000003 HC RX 258: Performed by: STUDENT IN AN ORGANIZED HEALTH CARE EDUCATION/TRAINING PROGRAM

## 2020-06-08 PROCEDURE — 76937 US GUIDE VASCULAR ACCESS: CPT

## 2020-06-08 PROCEDURE — 96361 HYDRATE IV INFUSION ADD-ON: CPT

## 2020-06-08 PROCEDURE — 7200000001 HC VAGINAL DELIVERY

## 2020-06-08 PROCEDURE — 96360 HYDRATION IV INFUSION INIT: CPT

## 2020-06-08 PROCEDURE — 59409 OBSTETRICAL CARE: CPT | Performed by: OBSTETRICS & GYNECOLOGY

## 2020-06-08 PROCEDURE — 86780 TREPONEMA PALLIDUM: CPT

## 2020-06-08 PROCEDURE — 86850 RBC ANTIBODY SCREEN: CPT

## 2020-06-08 PROCEDURE — 85610 PROTHROMBIN TIME: CPT

## 2020-06-08 PROCEDURE — 6360000002 HC RX W HCPCS: Performed by: STUDENT IN AN ORGANIZED HEALTH CARE EDUCATION/TRAINING PROGRAM

## 2020-06-08 PROCEDURE — 10907ZC DRAINAGE OF AMNIOTIC FLUID, THERAPEUTIC FROM PRODUCTS OF CONCEPTION, VIA NATURAL OR ARTIFICIAL OPENING: ICD-10-PCS | Performed by: OBSTETRICS & GYNECOLOGY

## 2020-06-08 PROCEDURE — 86901 BLOOD TYPING SEROLOGIC RH(D): CPT

## 2020-06-08 RX ORDER — DOCUSATE SODIUM 100 MG/1
100 CAPSULE, LIQUID FILLED ORAL 2 TIMES DAILY
Status: DISCONTINUED | OUTPATIENT
Start: 2020-06-08 | End: 2020-06-09 | Stop reason: HOSPADM

## 2020-06-08 RX ORDER — IBUPROFEN 800 MG/1
800 TABLET ORAL EVERY 8 HOURS PRN
Status: DISCONTINUED | OUTPATIENT
Start: 2020-06-08 | End: 2020-06-09 | Stop reason: HOSPADM

## 2020-06-08 RX ORDER — BISACODYL 10 MG
10 SUPPOSITORY, RECTAL RECTAL DAILY PRN
Status: DISCONTINUED | OUTPATIENT
Start: 2020-06-08 | End: 2020-06-09 | Stop reason: HOSPADM

## 2020-06-08 RX ORDER — IBUPROFEN 600 MG/1
600 TABLET ORAL EVERY 6 HOURS PRN
Qty: 30 TABLET | Refills: 0 | Status: SHIPPED | OUTPATIENT
Start: 2020-06-08

## 2020-06-08 RX ORDER — ACETAMINOPHEN 500 MG
1000 TABLET ORAL EVERY 6 HOURS PRN
Status: DISCONTINUED | OUTPATIENT
Start: 2020-06-08 | End: 2020-06-08 | Stop reason: HOSPADM

## 2020-06-08 RX ORDER — SODIUM CHLORIDE 0.9 % (FLUSH) 0.9 %
10 SYRINGE (ML) INJECTION EVERY 12 HOURS SCHEDULED
Status: DISCONTINUED | OUTPATIENT
Start: 2020-06-08 | End: 2020-06-08 | Stop reason: HOSPADM

## 2020-06-08 RX ORDER — ACETAMINOPHEN 500 MG
1000 TABLET ORAL EVERY 6 HOURS PRN
Status: DISCONTINUED | OUTPATIENT
Start: 2020-06-08 | End: 2020-06-09 | Stop reason: HOSPADM

## 2020-06-08 RX ORDER — DOCUSATE SODIUM 100 MG/1
100 CAPSULE, LIQUID FILLED ORAL 2 TIMES DAILY
Qty: 60 CAPSULE | Refills: 1 | Status: SHIPPED | OUTPATIENT
Start: 2020-06-08 | End: 2020-07-08

## 2020-06-08 RX ORDER — LANOLIN 100 %
OINTMENT (GRAM) TOPICAL PRN
Status: DISCONTINUED | OUTPATIENT
Start: 2020-06-08 | End: 2020-06-09 | Stop reason: HOSPADM

## 2020-06-08 RX ORDER — SODIUM CHLORIDE, SODIUM LACTATE, POTASSIUM CHLORIDE, CALCIUM CHLORIDE 600; 310; 30; 20 MG/100ML; MG/100ML; MG/100ML; MG/100ML
INJECTION, SOLUTION INTRAVENOUS CONTINUOUS
Status: DISCONTINUED | OUTPATIENT
Start: 2020-06-08 | End: 2020-06-08

## 2020-06-08 RX ORDER — ONDANSETRON 2 MG/ML
4 INJECTION INTRAMUSCULAR; INTRAVENOUS EVERY 6 HOURS PRN
Status: DISCONTINUED | OUTPATIENT
Start: 2020-06-08 | End: 2020-06-08 | Stop reason: HOSPADM

## 2020-06-08 RX ORDER — SIMETHICONE 80 MG
80 TABLET,CHEWABLE ORAL EVERY 6 HOURS PRN
Status: DISCONTINUED | OUTPATIENT
Start: 2020-06-08 | End: 2020-06-09 | Stop reason: HOSPADM

## 2020-06-08 RX ORDER — SODIUM CHLORIDE 0.9 % (FLUSH) 0.9 %
10 SYRINGE (ML) INJECTION PRN
Status: DISCONTINUED | OUTPATIENT
Start: 2020-06-08 | End: 2020-06-08 | Stop reason: HOSPADM

## 2020-06-08 RX ORDER — ACETAMINOPHEN 325 MG/1
650 TABLET ORAL EVERY 4 HOURS PRN
Status: DISCONTINUED | OUTPATIENT
Start: 2020-06-08 | End: 2020-06-08 | Stop reason: SDUPTHER

## 2020-06-08 RX ORDER — ONDANSETRON 4 MG/1
4 TABLET, FILM COATED ORAL EVERY 6 HOURS PRN
Status: DISCONTINUED | OUTPATIENT
Start: 2020-06-08 | End: 2020-06-09 | Stop reason: HOSPADM

## 2020-06-08 RX ADMIN — DOCUSATE SODIUM 100 MG: 100 CAPSULE, LIQUID FILLED ORAL at 21:23

## 2020-06-08 RX ADMIN — ENOXAPARIN SODIUM 90 MG: 80 INJECTION SUBCUTANEOUS at 21:23

## 2020-06-08 RX ADMIN — SODIUM CHLORIDE, POTASSIUM CHLORIDE, SODIUM LACTATE AND CALCIUM CHLORIDE: 600; 310; 30; 20 INJECTION, SOLUTION INTRAVENOUS at 11:47

## 2020-06-08 NOTE — L&D DELIVERY NOTE
Mother's Information    Labor Events    Rupture type:  Artificial=AROM, Intact  Fluid color:  Clear  Fluid odor:  None     Mother Delivery Information    Surgical or Additional Est. Blood Loss (mL):  0 (View Only):  Edit in Flowsheets   Combined Est. Blood Loss (mL):  0        Star Soulier Girl Cavaughn [[id number]]    Labor Events    Cervical ripening date/time:     Rupture date/time:     Rupture type:  Artificial=AROM, Intact  Fluid color:  Clear  Fluid odor:  None           Information    Head delivery date/time:  2020 12:59:00   Changing the 's delivery date/time could affect patient care.:     Delivery date/time:   20 1259     Details:         Delivery Providers    Delivering clinician:       Tamiment Measurements       Delivery Information    Surgical or additional est. blood loss (mL):  0 (View Only):  Edit in Flowsheets   Combined est. blood loss (mL):  0          Vaginal Delivery Note  Department of Obstetrics and Gynecology  [facility]       Patient: Bubba Becker   : 1990  MRN: [id number]   Date of delivery: 20     Pre-operative Diagnosis: Bubba Becker [id number] at 38w4d, IUP     Spontaneous labor    H/O Postpartum PE    H/O PreE (G2)    H/O indicated PTD (G2 @ 29W, preE)    FH of neural tube defect    H/O of postpartum depression    Non-compliance    Post-operative Diagnosis:  Living  female infant, same as above    Delivering Obstetrician & Assistant(s): Dr. Dimas Lizarraga; Anthony Preston [place]; Niall Gonzalez, PGY1    Infant Information:   Information for the patient's :  Marci Vallejo [[id number]]        Information for the patient's :  Marci Vallejo [[id number]]          Apgar scores: 8 at 1 minute and 9 at 5 minutes. Anesthesia:  none    Application and Delivery:    She was known to be GBS negative.      The patient progressed well, became complete and felt the urge to

## 2020-06-08 NOTE — PROGRESS NOTES
Labor Progress Note    Margaret Espinal is a 30 y.o. female [id number] at 38w3d  The patient was seen and examined. Her pain is well controlled. She reports fetal movement is present, complains of contractions, denies loss of fluid, denies vaginal bleeding.        Vital Signs:  Vitals:    06/08/20 1005   BP: 136/72   Pulse: 109   Resp: 16   Temp: 97 °F (36.1 °C)        FHT: 140, moderate variability, accelerations present, few early decelerations  Contractions: regular, every 5-6 minutes  Cervical Exam: 4-5 cm dilated, 80 effaced, -2 station  Pitocin: @ 0 mu/min    Membranes: Intact  Scalp Electrode in place: absent  Intrauterine Pressure Catheter in Place: absent    Interventions: none    Assessment/Plan:  Margaret Espinal is a 30 y.o. female [id number] at 38w3d admitted for spontaneous labor   - GBS negative, No indication for GBS prophylaxis   - VSS, afebrile   - cEFM/TOCO   - Difficulty obtaining IV access, plan to consult IV team   - Continue expectant management    Attending updated and in agreement with plan    Angela Moreno DO  Ob/Gyn Resident  6/8/2020, 11:32 AM

## 2020-06-08 NOTE — PLAN OF CARE
Problem: Pain:  Goal: Control of acute pain  Description: Control of acute pain  Outcome: Ongoing  Goal: Control of chronic pain  Description: Control of chronic pain  Outcome: Ongoing     Problem: Discharge Planning:  Goal: Discharged to appropriate level of care  Description: Discharged to appropriate level of care  Outcome: Ongoing     Problem: Fluid Volume - Imbalance:  Goal: Absence of imbalanced fluid volume signs and symptoms  Description: Absence of imbalanced fluid volume signs and symptoms  Outcome: Ongoing  Goal: Absence of postpartum hemorrhage signs and symptoms  Description: Absence of postpartum hemorrhage signs and symptoms  Outcome: Ongoing     Problem: Infection - Risk of, Puerperal Infection:  Goal: Will show no infection signs and symptoms  Description: Will show no infection signs and symptoms  Outcome: Ongoing

## 2020-06-08 NOTE — FLOWSHEET NOTE
IV team at bedside after 3 attempts per nurses. olerated well. Quiet and breathing with contractions.  supportive at bedside. PO fluids at bedside.

## 2020-06-08 NOTE — FLOWSHEET NOTE
Patient admitted to room 737 from L&D via WC. Oriented to room and surroundings. Plan of care reviewed. Verbalized understanding. Instructed on infant security and safe sleep practices. Preventing falls education provided . The following handouts given: A New Beginning: Your Guide to Postpartum Care, Rounding, gs Security System,Babies Cry A lot, Safe Sleep, Security and Visitation Guidelines. Call light placed within reach.

## 2020-06-09 VITALS
BODY MASS INDEX: 34.66 KG/M2 | WEIGHT: 208 LBS | RESPIRATION RATE: 20 BRPM | HEIGHT: 65 IN | TEMPERATURE: 99 F | SYSTOLIC BLOOD PRESSURE: 115 MMHG | HEART RATE: 89 BPM | DIASTOLIC BLOOD PRESSURE: 67 MMHG

## 2020-06-09 LAB
FERN TESTING (POC): NORMAL
NITRAZINE PH (POC): NEGATIVE

## 2020-06-09 PROCEDURE — 6370000000 HC RX 637 (ALT 250 FOR IP): Performed by: STUDENT IN AN ORGANIZED HEALTH CARE EDUCATION/TRAINING PROGRAM

## 2020-06-09 PROCEDURE — 6360000002 HC RX W HCPCS: Performed by: STUDENT IN AN ORGANIZED HEALTH CARE EDUCATION/TRAINING PROGRAM

## 2020-06-09 RX ADMIN — IBUPROFEN 800 MG: 800 TABLET, FILM COATED ORAL at 01:03

## 2020-06-09 RX ADMIN — ACETAMINOPHEN 1000 MG: 500 TABLET ORAL at 07:55

## 2020-06-09 RX ADMIN — ENOXAPARIN SODIUM 90 MG: 80 INJECTION SUBCUTANEOUS at 09:00

## 2020-06-09 RX ADMIN — DOCUSATE SODIUM 100 MG: 100 CAPSULE, LIQUID FILLED ORAL at 08:59

## 2020-06-09 ASSESSMENT — PAIN SCALES - GENERAL
PAINLEVEL_OUTOF10: 7
PAINLEVEL_OUTOF10: 4

## 2020-06-09 NOTE — CARE COORDINATION
Social Work    Sw met with mom to complete assessment due to seeing hx of PPD and non compliance with pnc appts. Mom was appropriate and interactive with Sw. Fob asleep during assessment. Mom reports she is doing pretty good and denied any current s/s of anxiety or depression. Mom reports a really good support system that includes her , her in laws, and her parents. Mom reports she,  and 3 other daughters ( 8, 4, 2) reside together. Mom reports she has all baby items, including safe place for baby to sleep. Mom denied working with any community tx's or suports and denied any need for a referral. Mom reports no barriers to pnc or pediatrician appt and states she has an appt made for pt at [facility] [address]. Mom denied any needs at this time. Sw encouraged mom to reach out if sw can assist in any way.

## 2020-06-25 ENCOUNTER — TELEPHONE (OUTPATIENT)
Dept: OBGYN CLINIC | Age: 30
End: 2020-06-25

## 2021-02-04 NOTE — PLAN OF CARE
Detail Level: Simple Problem: Discharge Planning:  Goal: Discharged to appropriate level of care  Discharged to appropriate level of care   Outcome: Ongoing Quality 137: Melanoma: Continuity Of Care - Recall System: Patient information entered into a recall system that includes: target date for the next exam specified AND a process to follow up with patients regarding missed or unscheduled appointments When Should The Patient Follow-Up For Their Next Full-Body Skin Exam?: 3 Months Detail Level: Zone Detail Level: Generalized

## 2021-06-28 ENCOUNTER — OFFICE VISIT (OUTPATIENT)
Dept: OBGYN CLINIC | Age: 31
End: 2021-06-28
Payer: MEDICAID

## 2021-06-28 VITALS
BODY MASS INDEX: 26.96 KG/M2 | DIASTOLIC BLOOD PRESSURE: 68 MMHG | WEIGHT: 161.8 LBS | HEIGHT: 65 IN | HEART RATE: 80 BPM | SYSTOLIC BLOOD PRESSURE: 108 MMHG

## 2021-06-28 DIAGNOSIS — Z30.09 FAMILY PLANNING COUNSELING: Primary | ICD-10-CM

## 2021-06-28 PROBLEM — Z23 FLU VACCINE NEED: Status: RESOLVED | Noted: 2020-01-17 | Resolved: 2021-06-28

## 2021-06-28 PROBLEM — O09.90 HIGH RISK PREGNANCY, ANTEPARTUM: Status: RESOLVED | Noted: 2019-12-16 | Resolved: 2021-06-28

## 2021-06-28 PROBLEM — Z23 NEED FOR TDAP VACCINATION: Status: RESOLVED | Noted: 2017-07-11 | Resolved: 2021-06-28

## 2021-06-28 PROBLEM — R74.8 ELEVATED LIVER ENZYMES: Status: RESOLVED | Noted: 2018-05-03 | Resolved: 2021-06-28

## 2021-06-28 PROBLEM — O09.93 HRP (HIGH RISK PREGNANCY), THIRD TRIMESTER: Status: RESOLVED | Noted: 2020-04-05 | Resolved: 2021-06-28

## 2021-06-28 PROCEDURE — 99212 OFFICE O/P EST SF 10 MIN: CPT | Performed by: ADVANCED PRACTICE MIDWIFE

## 2021-06-28 PROCEDURE — 1036F TOBACCO NON-USER: CPT | Performed by: ADVANCED PRACTICE MIDWIFE

## 2021-06-28 PROCEDURE — G8427 DOCREV CUR MEDS BY ELIG CLIN: HCPCS | Performed by: ADVANCED PRACTICE MIDWIFE

## 2021-06-28 PROCEDURE — G8419 CALC BMI OUT NRM PARAM NOF/U: HCPCS | Performed by: ADVANCED PRACTICE MIDWIFE

## 2021-06-28 SDOH — ECONOMIC STABILITY: FOOD INSECURITY: WITHIN THE PAST 12 MONTHS, THE FOOD YOU BOUGHT JUST DIDN'T LAST AND YOU DIDN'T HAVE MONEY TO GET MORE.: NEVER TRUE

## 2021-06-28 SDOH — ECONOMIC STABILITY: TRANSPORTATION INSECURITY
IN THE PAST 12 MONTHS, HAS LACK OF TRANSPORTATION KEPT YOU FROM MEETINGS, WORK, OR FROM GETTING THINGS NEEDED FOR DAILY LIVING?: NO

## 2021-06-28 SDOH — ECONOMIC STABILITY: TRANSPORTATION INSECURITY
IN THE PAST 12 MONTHS, HAS THE LACK OF TRANSPORTATION KEPT YOU FROM MEDICAL APPOINTMENTS OR FROM GETTING MEDICATIONS?: NO

## 2021-06-28 SDOH — ECONOMIC STABILITY: FOOD INSECURITY: WITHIN THE PAST 12 MONTHS, YOU WORRIED THAT YOUR FOOD WOULD RUN OUT BEFORE YOU GOT MONEY TO BUY MORE.: NEVER TRUE

## 2021-06-28 ASSESSMENT — SOCIAL DETERMINANTS OF HEALTH (SDOH)
WITHIN THE LAST YEAR, HAVE YOU BEEN AFRAID OF YOUR PARTNER OR EX-PARTNER?: NO
WITHIN THE LAST YEAR, HAVE YOU BEEN KICKED, HIT, SLAPPED, OR OTHERWISE PHYSICALLY HURT BY YOUR PARTNER OR EX-PARTNER?: NO
HOW HARD IS IT FOR YOU TO PAY FOR THE VERY BASICS LIKE FOOD, HOUSING, MEDICAL CARE, AND HEATING?: NOT HARD AT ALL
WITHIN THE LAST YEAR, HAVE YOU BEEN HUMILIATED OR EMOTIONALLY ABUSED IN OTHER WAYS BY YOUR PARTNER OR EX-PARTNER?: NO
WITHIN THE LAST YEAR, HAVE TO BEEN RAPED OR FORCED TO HAVE ANY KIND OF SEXUAL ACTIVITY BY YOUR PARTNER OR EX-PARTNER?: NO

## 2021-06-28 ASSESSMENT — ENCOUNTER SYMPTOMS
RESPIRATORY NEGATIVE: 1
EYES NEGATIVE: 1
GASTROINTESTINAL NEGATIVE: 1
ALLERGIC/IMMUNOLOGIC NEGATIVE: 1

## 2021-06-28 ASSESSMENT — ANXIETY QUESTIONNAIRES
1. FEELING NERVOUS, ANXIOUS, OR ON EDGE: 0-NOT AT ALL
2. NOT BEING ABLE TO STOP OR CONTROL WORRYING: 0-NOT AT ALL
4. TROUBLE RELAXING: 0-NOT AT ALL
3. WORRYING TOO MUCH ABOUT DIFFERENT THINGS: 0-NOT AT ALL
7. FEELING AFRAID AS IF SOMETHING AWFUL MIGHT HAPPEN: 0-NOT AT ALL
GAD7 TOTAL SCORE: 0
5. BEING SO RESTLESS THAT IT IS HARD TO SIT STILL: 0-NOT AT ALL
6. BECOMING EASILY ANNOYED OR IRRITABLE: 0-NOT AT ALL

## 2021-06-28 ASSESSMENT — PATIENT HEALTH QUESTIONNAIRE - PHQ9
SUM OF ALL RESPONSES TO PHQ QUESTIONS 1-9: 0
SUM OF ALL RESPONSES TO PHQ QUESTIONS 1-9: 0
2. FEELING DOWN, DEPRESSED OR HOPELESS: 0
SUM OF ALL RESPONSES TO PHQ9 QUESTIONS 1 & 2: 0
SUM OF ALL RESPONSES TO PHQ QUESTIONS 1-9: 0
1. LITTLE INTEREST OR PLEASURE IN DOING THINGS: 0

## 2021-06-28 NOTE — PROGRESS NOTES
Aristides Pierre (:  1990) is a 35 y.o. female,Established patient, here for evaluation of the following chief complaint(s):  Contraception (Discuss IUD )         ASSESSMENT/PLAN:  1. Family planning counseling      Return in about 1 day (around 2021) for Mirena insertion. .         Subjective   SUBJECTIVE/OBJECTIVE:  Stephen Spears is here today requesting Mirena IUD. She is on day 3 of her menses. She has previously had a Mirena without problems. Review of Systems   Constitutional: Negative. HENT: Negative. Eyes: Negative. Respiratory: Negative. Cardiovascular: Negative. Gastrointestinal: Negative. Endocrine: Negative. Genitourinary: Negative. Musculoskeletal: Negative. Skin: Negative. Allergic/Immunologic: Negative. Neurological: Negative. Hematological: Negative. Psychiatric/Behavioral: Negative. Objective       /68   Pulse 80   Ht 5' 5\" (1.651 m)   Wt 161 lb 12.8 oz (73.4 kg)   LMP 2021 (Exact Date)   BMI 26.92 kg/m²             An electronic signature was used to authenticate this note.     --LINDA Hurtado CNM

## 2021-06-29 ENCOUNTER — PROCEDURE VISIT (OUTPATIENT)
Dept: OBGYN CLINIC | Age: 31
End: 2021-06-29
Payer: MEDICAID

## 2021-06-29 ENCOUNTER — HOSPITAL ENCOUNTER (OUTPATIENT)
Age: 31
Setting detail: SPECIMEN
Discharge: HOME | End: 2021-06-29
Payer: MEDICAID

## 2021-06-29 VITALS
SYSTOLIC BLOOD PRESSURE: 127 MMHG | HEIGHT: 65 IN | DIASTOLIC BLOOD PRESSURE: 89 MMHG | BODY MASS INDEX: 26.84 KG/M2 | WEIGHT: 161.1 LBS | HEART RATE: 83 BPM

## 2021-06-29 DIAGNOSIS — N94.6 DYSMENORRHEA: ICD-10-CM

## 2021-06-29 DIAGNOSIS — Z30.430 ENCOUNTER FOR INSERTION OF MIRENA IUD: ICD-10-CM

## 2021-06-29 DIAGNOSIS — Z30.430 ENCOUNTER FOR INSERTION OF MIRENA IUD: Primary | ICD-10-CM

## 2021-06-29 LAB
CONTROL: PRESENT
PREGNANCY TEST URINE, POC: NEGATIVE

## 2021-06-29 PROCEDURE — 81025 URINE PREGNANCY TEST: CPT | Performed by: ADVANCED PRACTICE MIDWIFE

## 2021-06-29 PROCEDURE — 58300 INSERT INTRAUTERINE DEVICE: CPT | Performed by: ADVANCED PRACTICE MIDWIFE

## 2021-06-29 NOTE — PROGRESS NOTES
[facility]   [facility] [address] [address] [place] [id number]  Dept: [phone]    Patient Name: Soledad Hare  Patient Age: 35 y.o. Date of Visit: 2021    SUBJECTIVE:    CC:    Chief Complaint   Patient presents with    Procedure     Mirena Insertion       Carlos Sargent presents today for insertion of Mirena IUD for her complaint of  dysmenorrhea. Carlos Sargent understands the risks and benefits of the intrauterine device insertion and desires to proceed with its insertion. OBJECTIVE:  Patient's last menstrual period was 2021 (exact date). /89   Pulse 83   Ht 5' 5\" (1.651 m)   Wt 161 lb 1.6 oz (73.1 kg)   LMP 2021 (Exact Date)   BMI 26.81 kg/m²     GYN HISTORY:    History of STI: no    New Sex Partner (within 3 months): no   Negative Gonorrhea/ Chlamydia testing: no, collected at visit    Past Medical History:   Diagnosis Date    Depression     post partum, no meds,     Pulmonary embolism ([facility] [address]     18          OB History    Para Term  AB Living   4 4 3 1 0 4   SAB TAB Ectopic Molar Multiple Live Births   0 0 0 0 0 4      # Outcome Date GA Lbr Epifanio/2nd Weight Sex Delivery Anes PTL Lv   4 Term 20 38w4d 07:35 / 00:09 6 lb 8.9 oz (2.975 kg) F Vag-Spont None N LETITIA   3 Term 18 38w5d 06:05 / 00:03 7 lb 7.2 oz (3.38 kg) F Vag-Spont None N LETITIA   2  10/10/15 29w0d  2 lb 6 oz (1.077 kg) F Vag-Spont         Birth Comments: pre-eclampsia   1 Term 09 38w0d  5 lb 1 oz (2.296 kg) F Vag-Spont          Urine pregnancy test: negative    Procedure: The patient was positioned comfortably on the exam table. After a bi-manual exam; the uterus was found to be  anteverted. There was no cervical motion tenderness. A sterile speculum was inserted. The cervix was visualized and prepped with Betadine. A tenaculum was applied to the anterior lip of the cervix.  A sound was placed through the cervical os in sterile fashion and the uterus was sounded to 8 cm. The Mirena IUD was loaded in the applicator and the indicator placed according to the sound. The applicator was then inserted into the cervix and the Intrauterine Device was placed in the endometrial cavity. The applicator was removed and the strings were trimmed to 3 cm. The patient tolerated the insertion well. ASSESSMENT:  IUD insertion  Dysmenorrhea     PLAN:  1.  IUD counseling was done with Bakari Horton. The  Mirena booklet was given to the patient. The consent was signed and scanned into the chart. The Lot # is charted on the CC. Bakari Horton was given the IUD identification card. LIN Daniels 9 was educated that the most common side effects are menstrual changes, lower abdominal pain, back pain, breast tenderness, headache, mood changes, nausea, and acne and are most prevalent during the first 3 months and subside over time. 3.  Post-insertion instructions were also given. Bakari Horton was instructed to not put anything in her vagina for 24 hours, and verbalized understanding. Bakari Horton was instructed to call promptly if she notices any change in the length of the thread or can feel part of the IUD, having pain with sex, having any unusually heavy bleeding from her vagina, believes she is pregnant, believes she may have been exposed to an STI, has unusual pelvic pain, cramping or soreness in her abdomen, has unexplained fever or chills. [address] [place] [address] was educated that no back up method of contraception is needed if inserted within 7 days after her last menstrual cycle. However, if greater than 7 days Bakari Horton was instructed to use another method of birth control for 7 days if having sex. Due to previous malposition of IUD needing removal discussed doing pelvic ultrasound to confirm placement. Due for pap smear discussed returning in 4 weeks for annual with string check.       Return in about 4 weeks (around 7/27/2021) for annual with string check.     Electronically Signed by: Wolfgang Thomas, Shiva Alcaraz

## 2021-06-29 NOTE — PROGRESS NOTES
Mirena was inserted into the patient by CARITO GUAJARDO [facility] CNM , pt tolerated well, no adverse reactions noted.     Lot number [id number]  Exp date 10/01/2023  RICKI:[id number]

## 2021-07-26 PROBLEM — Z91.199 NON-COMPLIANCE: Status: RESOLVED | Noted: 2020-04-05 | Resolved: 2021-07-26

## 2021-07-26 PROBLEM — O99.210 MATERNAL OBESITY AFFECTING PREGNANCY, ANTEPARTUM: Status: RESOLVED | Noted: 2020-02-14 | Resolved: 2021-07-26

## 2021-07-26 PROBLEM — N39.0 UTI (URINARY TRACT INFECTION): Status: RESOLVED | Noted: 2018-05-03 | Resolved: 2021-07-26

## 2022-03-22 ENCOUNTER — TELEPHONE (OUTPATIENT)
Dept: OBGYN CLINIC | Age: 32
End: 2022-03-22

## 2022-03-22 NOTE — TELEPHONE ENCOUNTER
lvm for pt to complete pelvic US's ordered 6/29/21 or to let office know if she doesn't plan on completing

## 2022-12-28 ENCOUNTER — TELEPHONE (OUTPATIENT)
Dept: OBGYN CLINIC | Age: 32
End: 2022-12-28

## 2022-12-28 NOTE — TELEPHONE ENCOUNTER
Appointment Request From: Joce Delgado     With Provider: Claudean Height, [address] Obstetrics [facility] Gynecology ]     Preferred Date Range: 12/28/2022 - 12/29/2022     Preferred Times: Any Time     Reason for visit: Request an Appointment     Comments:  Pregnancy and check IUD

## 2025-02-28 NOTE — H&P
AMBULATORY CASE MANAGEMENT NOTE    Names and Relationships of Patient/Support Persons: Contact: [facility]; Relationship: Provider -     Care Coordination    Per chart review, [facility] called PCP office yesterday inquiring about an order for a NIPPV. Spoke with Dayday at [facility]. Dayday stated the patient does not need a sleep study. They will need a benefit statement, chart notes, and order. ACM explained that PCP is going to allow the pulmonary provider order the equipment.     Care Coordination    Message to PCP regarding the above information and she will forward message to pulmonary provider.         Justine ASCENCIO  Ambulatory Case Management    2/28/2025, 12:19 CST   OBSTETRICAL HISTORY [facility]    Date: 2020       Time: 11:21 AM   Patient Name: Radha Bo     Patient : 1990  Room/Bed: 6509/1529-83    Admission Date/Time: 2020  9:41 AM      CC: Contractions     HPI: Radha Bo is a 30 y.o. [id number] at 38w4d who presents complaining of contractions. She reports her contractions started to become regular around 0500 this morning. She reports they are coming every 3 minutes. She reports a small amount of leaking of discharge earlier today. She denies a big gush of fluid or continuous leaking. The patient reports fetal movement is present, complains of contractions, denies loss of fluid, denies vaginal bleeding. She denies fevers, chills, CP, SOB, HA, VC, RUQ pain, increased swelling, N/V/D, or dysuria. She denies any recent sick contacts or cough. COVID negative on 20. Pregnancy is complicated by hx preE (G2), hx indicated PTD (G2 @29wk due to preE), hx postpartum PE on anticoaulation, FH spina bifida, hx postpartum depression, obesity, noncompliance     DATING:  LMP: Patient's last menstrual period was 2019 (exact date).   Estimated Date of Delivery: 20   Based on: LMP, confirmed by ultrasound at 13 3/7 weeks GA    PREGNANCY RISK FACTORS:  Patient Active Problem List   Diagnosis    Need for Tdap vaccination    H/O PreE (G2)    H/O indicated PTD (G2 @ 27W)    H/O Postpartum PE    Elevated liver enzymes    Bacteriuria    High risk pregnancy, antepartum    Family history of neural tube defect    History of postpartum depression    Flu vaccine need    Maternal obesity    HRP (high risk pregnancy), third trimester    Non-compliance        Steroids Given In This Pregnancy:  no     REVIEW OF SYSTEMS:   Constitutional: negative fever, negative chills, negative weight changes   HEENT: negative visual disturbances, negative headaches, negative dizziness, negative hearing daily 5/5/18  Yes Edel Lamb MD   Prenatal Vit-Fe Fumarate-FA (PRENATAL VITAMINS) 28-0.8 MG TABS Take 1 tablet by mouth daily Please dispense any generic insurance will cover. 7/11/17  Yes LINDA Wong CNM   acetaminophen (TYLENOL) 500 MG tablet Take 2 tablets by mouth every 6 hours as needed for Pain 1/21/20   Mary Patel DO       FAMILY HISTORY:  family history includes Alcohol Abuse in her paternal uncle; Allergy (Severe) in her maternal grandmother; Arthritis in her maternal grandmother and paternal grandfather; Asthma in her maternal grandmother; Cancer in her paternal aunt; Depression in her mother; Diabetes in her maternal grandfather, maternal grandmother, paternal grandfather, and paternal grandmother; Heart Attack in her paternal grandfather; High Blood Pressure in her maternal grandfather, maternal grandmother, paternal grandfather, and paternal grandmother; Kidney Disease in her maternal grandmother. SOCIAL HISTORY:   reports that she has never smoked. She has never used smokeless tobacco. She reports that she does not drink alcohol or use drugs.     VITALS:  Vitals:    06/08/20 1005   BP: 136/72   Pulse: 109   Resp: 16   Temp: 97 °F (36.1 °C)       PHYSICAL EXAM:  Fetal Heart Monitor:  Baseline Heart Rate 140, moderate variability, present accelerations, early decelerations  [place]: contractions, regular, every 5 minutes    General appearance:  no apparent distress, alert and cooperative  HEENT: head atraumatic, normocephalic, moist mucous membranes, trachea midline  Neurologic:  alert, oriented, normal speech, no focal findings or movement disorder noted  Lungs:  No increased work of breathing, good air exchange, clear to auscultation bilaterally, no crackles or wheezing  Heart:  regular rate and rhythm and no murmur, rubs, gallops  Abdomen:  soft, gravid, non-tender, no right upper quadrant tenderness, no CVA tenderness, uterus non-tender, no signs of abruption and no signs of chorioamnionitis  Extremities:  no calf tenderness, non edematous, no varicosities, full range of motion in all four extremities, DTRs: normal  Musculoskeletal: Gross strength equal and intact throughout, no gross abnormalities, range of motion normal in hips, knees, shoulders and spine  Psychiatric: Mood appropriate, normal affect     Pelvic Exam:  Vulva: normal appearing vulva, no masses, tenderness or lesions, normal clitoris  Vagina: normal appearing vagina with normal color and discharge, no lesions  Cervix: no cervical motion tenderness  Uterus: is gravid, normal size, shape, consistency and non-tender   Adnexa: non-tender, no palpable masses     Speculum: Normal appearing external genitalia, vulva and vagina without edema, erythema or masses, no gross blood, cervix visually dilated, mucous discharge present but no pooling of fluid    Cervix Check: 4 to 5 cm dilated, 80 % effaced, -2 station, mid position, soft consistency, FETAL POSITION: Cephalic (confirmed by ultrasound), Membranes intact     Rectal Exam: not indicated     DATA:  Membranes Ruptured: No  Fern: negative  Nitrazine: Negative  Valsalva/Pooling: absent  Vaginal Bleeding: absent      OMM Structural Exam:  Chief Complaint:  Pregnancy    Anterior/ Posterior Spinal Curves: Lumbar Lordosis -  Increased  Scoliosis (Lateral Spinal Curves): None  Assessment Tool:  T= Tenderness, A= Asymmetry, R= Restricted Motion (A=Active, P=Passive), T=Tissue Texture Changes  Region Evaluated : Severity / Specific of Major Somatic Dysfunction  M99.03 Lumbar -  Minor TART - more than BG levels -   Major Correlations with:  Genitourinary  Structural Diagnosis: Increased lumbar lordosis  Treatment Plan: Outpatient     LIMITED BEDSIDE US:  Position: Cephalic  Placental Location: posterior  Fetal Heart Tones: Present  Fetal Movement: Present  Amniotic Fluid Index/Volume: adequate 2x2 cm fluid pocket    PRENATAL LAB RESULTS:   Blood Type/Rh: B positive  Antibody Screen: